# Patient Record
Sex: FEMALE | Race: BLACK OR AFRICAN AMERICAN | NOT HISPANIC OR LATINO | URBAN - METROPOLITAN AREA
[De-identification: names, ages, dates, MRNs, and addresses within clinical notes are randomized per-mention and may not be internally consistent; named-entity substitution may affect disease eponyms.]

---

## 2017-08-02 VITALS
TEMPERATURE: 97 F | RESPIRATION RATE: 16 BRPM | WEIGHT: 223.99 LBS | SYSTOLIC BLOOD PRESSURE: 132 MMHG | HEIGHT: 65 IN | HEART RATE: 78 BPM | DIASTOLIC BLOOD PRESSURE: 59 MMHG | OXYGEN SATURATION: 100 %

## 2017-08-02 RX ORDER — METFORMIN HYDROCHLORIDE 850 MG/1
1 TABLET ORAL
Qty: 0 | Refills: 0 | COMMUNITY

## 2017-08-02 RX ORDER — TRAVOPROST 0.04 MG/ML
1 SOLUTION/ DROPS OPHTHALMIC
Qty: 0 | Refills: 0 | COMMUNITY

## 2017-08-02 RX ORDER — SIMVASTATIN 20 MG/1
1 TABLET, FILM COATED ORAL
Qty: 0 | Refills: 0 | COMMUNITY

## 2017-08-02 RX ORDER — CHLORHEXIDINE GLUCONATE 213 G/1000ML
1 SOLUTION TOPICAL ONCE
Qty: 0 | Refills: 0 | Status: DISCONTINUED | OUTPATIENT
Start: 2017-08-04 | End: 2017-08-04

## 2017-08-02 RX ORDER — ASPIRIN/CALCIUM CARB/MAGNESIUM 324 MG
1 TABLET ORAL
Qty: 0 | Refills: 0 | COMMUNITY

## 2017-08-02 NOTE — H&P ADULT - HISTORY OF PRESENT ILLNESS
81 y.o Female with PMHx of HTN, dyslipidemia, NIDDM c/b neuropathy, Moderate Aortic Stenosis,   who presented to her cardiologist c/o dyspnea on am      Denies CP,  SOB, palpitations, dizziness, syncope, recent PND/orthopnea, LE edema, or decrease in exercise tolerance.    Echo done 03/2017 revealed no regional wall motion abnormailties     In light of pt's risk factors, above CCS Anginal Class _____ symptoms, and an abnormal Stress test, pt is now referred to [facility] for recommended Cardiac Cath with possible intervention if clinically indicated to  r/o suspected underlying CAD. 81 y.o Female with PMHx of HTN, dyslipidemia, NIDDM c/b neuropathy, Moderate Aortic Stenosis,   who presented to her cardiologist c/o dyspnea on am      Denies CP,  SOB, palpitations, dizziness, syncope, recent PND/orthopnea, LE edema, or decrease in exercise tolerance.    Echo done 03/2017 revealed no regional wall motion abnormalities     In light of pt's risk factors, above CCS Anginal Class _____ symptoms, and an abnormal Stress test, pt is now referred to [facility] for recommended Cardiac Cath with possible intervention if clinically indicated to  r/o suspected underlying CAD. 81 y.o Obese Female ambulates with a cane with PMHx of HTN, hyperlipidemia, EDE, NIDDM c/b neuropathy, ? EDE, (awaiting sleep study), moderate to severe aortic stenosis by Echo 03/2017, Non-obstructive CAD s/p diagnostic cardiac cath @ [facility] on 08/07/15 revealing normal LM, LAD/DIAG/LCx with mild luminal irregularities, 30% mid RCA, LVEF of 65%.  LVEDP of 7mmHg.  AS: Mean gradient opf 20.5mmHg.  KAYLENE of 1.46mm2.  NO MR noted.  Patient was recommended for medical therapy and started on ASA 81mg PO indefinitely for non obstructive CAD.  She was also recommended for AVR, however, pt remained undecisive at the time and did not want any pre-op work-up.  During follow-up visit with her cardiologist Dr. Davey  pt c/o  MARSHALL with increasing fatigue upon ambulation of less than one city block over the past few months.  She has also noticed experiencing dull, left sided chest pain described as a pressure , lasting minutes, unrelated to activity, and spontaneously resolve on their own.  Denies CP, recent PND/orthopnea, LE edema, palpitations, dizziness, or syncope .  Echo done 03/10/17 revealed no regional wall motion abnormalities,  mildly dilated left atrium, moderate to severe asymmetric hypertrophy pf the septum.  Moderate to severe aortic stenosis with KAYLENE of 0.7-0.97 (previously 1.0-1.4). Marked mitral annular calcification. Mild TR/LA, grade 2 diastolic dysfunction, LVEF of 68%.       In light of pt's risk factors, above CCS Anginal Class 4 symptoms, and worsening AS, pt is now referred to [facility] for recommended pre-op Right and Left Heart Cath prior to possible referral TAVR.      CATH HX:  Cardiac Cath @ [facility] on 08/07/15: Normal LM, LAD/DIAG/LCx with mild luminal irregularities, 30% mid RCA, LVEF of 65%.  LVEDP of 7mmHg.  AS: Mean gradient opf 20.5mmHg.  KAYLENE of 1.46mm2.  NO MR noted.  Recommended for optimal medical therapy and started on ASA 81mg PO indefinitely. 81 y.o Obese Female ambulates with a cane with PMHx of HTN, hyperlipidemia, NIDDM c/b neuropathy, ? EDE, (awaiting sleep study), moderate to severe aortic stenosis by Echo 03/2017, Non-obstructive CAD s/p diagnostic cardiac cath @ [facility] on 08/07/15 revealing normal LM, LAD/DIAG/LCx with mild luminal irregularities, 30% mid RCA, LVEF of 65%.  LVEDP of 7mmHg.  AS: Mean gradient opf 20.5mmHg.  KAYLENE of 1.46mm2.  NO MR noted.  Patient was recommended for medical therapy at the time and started on ASA 81mg PO indefinitely for non obstructive CAD.  She was also recommended for AVR, however, pt remained undecisive at the time and did not want any pre-op work-up.  During follow-up visit with her cardiologist Dr. Davey  pt c/o  MARSHALL with increasing fatigue upon ambulation of less than one city block over the past few months.  She has also noticed experiencing dull, left sided chest pain described as "pressure" , lasting minutes, unrelated to activity, and spontaneously resolves on its own.  Denies CP, recent PND/orthopnea, LE edema, palpitations, dizziness, or syncope .  Echo done 03/10/17 revealed no regional wall motion abnormalities,  mildly dilated left atrium, moderate to severe asymmetric hypertrophy pf the septum.  Moderate to severe aortic stenosis with KAYLENE of 0.7-0.97 (previously 1.0-1.4). Marked mitral annular calcification. Mild TR/MI, grade 2 diastolic dysfunction, LVEF of 68%.       In light of pt's risk factors, above CCS Anginal Class 4 symptoms, and worsening AS, pt is now referred to [facility] for recommended pre-op Right and Left Heart Cath prior to possible referral TAVR.      CATH HX:  Cardiac Cath @ [facility] on 08/07/15: Normal LM, LAD/DIAG/LCx with mild luminal irregularities, 30% mid RCA, LVEF of 65%.  LVEDP of 7mmHg.  AS: Mean gradient opf 20.5mmHg.  KAYLENE of 1.46mm2.  NO MR noted.  Recommended for optimal medical therapy and started on ASA 81mg PO indefinitely.

## 2017-08-02 NOTE — H&P ADULT - FAMILY HISTORY
Mother  Still living? Unknown  Family history of ischemic heart disease, Age at diagnosis: Age Unknown

## 2017-08-02 NOTE — H&P ADULT - PMH
Aortic stenosis    Diabetic neuropathy    Essential hypertension  Hypertension  Hyperlipidemia  Hyperlipidemia  Type 2 diabetes mellitus  Diabetes Aortic stenosis    Diabetic neuropathy    Essential hypertension  Hypertension  Glaucoma    Hyperlipidemia  Hyperlipidemia  Type 2 diabetes mellitus  Diabetes

## 2017-08-02 NOTE — H&P ADULT - ASSESSMENT
81 y.o Obese Female ambulates with a cane with PMHx of HTN, hyperlipidemia, EDE, NIDDM c/b neuropathy, ? EDE, (awaiting sleep study), moderate to severe aortic stenosis by Echo 03/2017, Non-obstructive CAD s/p diagnostic cardiac cath @ [facility] on 08/07/15 revealing normal LM, LAD/DIAG/LCx with mild luminal irregularities, 30% mid RCA, LVEF of 65%.  LVEDP of 7mmHg.  AS: Mean gradient opf 20.5mmHg.  KAYLENE of 1.46mm2.  NO MR noted.  Patient was recommended for medical therapy and started on ASA 81mg PO indefinitely for non obstructive CAD.  She was also recommended for AVR, however, pt remained undecisive at the time and did not want any pre-op work-up. She presents today  for diagnostic Right and Left Heart Cath prior to possible TAVR due to worsening symptomatic Aortic stenosis.      ASA: III and Mallampati: III

## 2017-08-04 ENCOUNTER — OUTPATIENT (OUTPATIENT)
Dept: OUTPATIENT SERVICES | Facility: HOSPITAL | Age: 82
LOS: 1 days | Discharge: NURSING, REHAB OR LONG-TERM CARE | End: 2017-08-04
Payer: MEDICARE

## 2017-08-04 LAB
ALBUMIN SERPL ELPH-MCNC: 4.4 G/DL — SIGNIFICANT CHANGE UP (ref 3.3–5)
ALP SERPL-CCNC: 77 U/L — SIGNIFICANT CHANGE UP (ref 40–120)
ALT FLD-CCNC: 12 U/L — SIGNIFICANT CHANGE UP (ref 10–45)
ANION GAP SERPL CALC-SCNC: 17 MMOL/L — SIGNIFICANT CHANGE UP (ref 5–17)
APTT BLD: 31.2 SEC — SIGNIFICANT CHANGE UP (ref 27.5–37.4)
AST SERPL-CCNC: 16 U/L — SIGNIFICANT CHANGE UP (ref 10–40)
BASOPHILS NFR BLD AUTO: 0.3 % — SIGNIFICANT CHANGE UP (ref 0–2)
BILIRUB SERPL-MCNC: 0.4 MG/DL — SIGNIFICANT CHANGE UP (ref 0.2–1.2)
BUN SERPL-MCNC: 33 MG/DL — HIGH (ref 7–23)
CALCIUM SERPL-MCNC: 10 MG/DL — SIGNIFICANT CHANGE UP (ref 8.4–10.5)
CHLORIDE SERPL-SCNC: 102 MMOL/L — SIGNIFICANT CHANGE UP (ref 96–108)
CHOLEST SERPL-MCNC: 135 MG/DL — SIGNIFICANT CHANGE UP (ref 10–199)
CK MB CFR SERPL CALC: 1.8 NG/ML — SIGNIFICANT CHANGE UP (ref 0–6.7)
CO2 SERPL-SCNC: 23 MMOL/L — SIGNIFICANT CHANGE UP (ref 22–31)
CREAT SERPL-MCNC: 1.4 MG/DL — HIGH (ref 0.5–1.3)
CRP SERPL-MCNC: 0.1 MG/DL — SIGNIFICANT CHANGE UP (ref 0–0.4)
EOSINOPHIL NFR BLD AUTO: 0.7 % — SIGNIFICANT CHANGE UP (ref 0–6)
GLUCOSE SERPL-MCNC: 160 MG/DL — HIGH (ref 70–99)
HBA1C BLD-MCNC: 6.7 % — HIGH (ref 4–5.6)
HCT VFR BLD CALC: 36.5 % — SIGNIFICANT CHANGE UP (ref 34.5–45)
HDLC SERPL-MCNC: 49 MG/DL — SIGNIFICANT CHANGE UP (ref 40–125)
HGB BLD-MCNC: 12 G/DL — SIGNIFICANT CHANGE UP (ref 11.5–15.5)
INR BLD: 0.86 — LOW (ref 0.88–1.16)
LIPID PNL WITH DIRECT LDL SERPL: 70 MG/DL — SIGNIFICANT CHANGE UP
LYMPHOCYTES # BLD AUTO: 29 % — SIGNIFICANT CHANGE UP (ref 13–44)
MCHC RBC-ENTMCNC: 29.7 PG — SIGNIFICANT CHANGE UP (ref 27–34)
MCHC RBC-ENTMCNC: 32.9 G/DL — SIGNIFICANT CHANGE UP (ref 32–36)
MCV RBC AUTO: 90.3 FL — SIGNIFICANT CHANGE UP (ref 80–100)
MONOCYTES NFR BLD AUTO: 7.4 % — SIGNIFICANT CHANGE UP (ref 2–14)
NEUTROPHILS NFR BLD AUTO: 62.6 % — SIGNIFICANT CHANGE UP (ref 43–77)
PLATELET # BLD AUTO: 256 K/UL — SIGNIFICANT CHANGE UP (ref 150–400)
POTASSIUM SERPL-MCNC: 3.8 MMOL/L — SIGNIFICANT CHANGE UP (ref 3.5–5.3)
POTASSIUM SERPL-SCNC: 3.8 MMOL/L — SIGNIFICANT CHANGE UP (ref 3.5–5.3)
PROT SERPL-MCNC: 8.5 G/DL — HIGH (ref 6–8.3)
PROTHROM AB SERPL-ACNC: 9.5 SEC — LOW (ref 9.8–12.7)
RBC # BLD: 4.04 M/UL — SIGNIFICANT CHANGE UP (ref 3.8–5.2)
RBC # FLD: 13.9 % — SIGNIFICANT CHANGE UP (ref 10.3–16.9)
SODIUM SERPL-SCNC: 142 MMOL/L — SIGNIFICANT CHANGE UP (ref 135–145)
TOTAL CHOLESTEROL/HDL RATIO MEASUREMENT: 2.8 RATIO — LOW (ref 3.3–7.1)
TRIGL SERPL-MCNC: 82 MG/DL — SIGNIFICANT CHANGE UP (ref 10–149)
WBC # BLD: 9.7 K/UL — SIGNIFICANT CHANGE UP (ref 3.8–10.5)
WBC # FLD AUTO: 9.7 K/UL — SIGNIFICANT CHANGE UP (ref 3.8–10.5)

## 2017-08-04 PROCEDURE — 80053 COMPREHEN METABOLIC PANEL: CPT

## 2017-08-04 PROCEDURE — C1769: CPT

## 2017-08-04 PROCEDURE — 85025 COMPLETE CBC W/AUTO DIFF WBC: CPT

## 2017-08-04 PROCEDURE — 93460 R&L HRT ART/VENTRICLE ANGIO: CPT | Mod: 26

## 2017-08-04 PROCEDURE — 93010 ELECTROCARDIOGRAM REPORT: CPT

## 2017-08-04 PROCEDURE — 93460 R&L HRT ART/VENTRICLE ANGIO: CPT

## 2017-08-04 PROCEDURE — 93306 TTE W/DOPPLER COMPLETE: CPT | Mod: 26

## 2017-08-04 PROCEDURE — 85610 PROTHROMBIN TIME: CPT

## 2017-08-04 PROCEDURE — C1887: CPT

## 2017-08-04 PROCEDURE — C1894: CPT

## 2017-08-04 PROCEDURE — C1889: CPT

## 2017-08-04 PROCEDURE — 93306 TTE W/DOPPLER COMPLETE: CPT

## 2017-08-04 PROCEDURE — 83036 HEMOGLOBIN GLYCOSYLATED A1C: CPT

## 2017-08-04 PROCEDURE — 85730 THROMBOPLASTIN TIME PARTIAL: CPT

## 2017-08-04 PROCEDURE — 82550 ASSAY OF CK (CPK): CPT

## 2017-08-04 PROCEDURE — 86140 C-REACTIVE PROTEIN: CPT

## 2017-08-04 PROCEDURE — 93005 ELECTROCARDIOGRAM TRACING: CPT

## 2017-08-04 PROCEDURE — 82553 CREATINE MB FRACTION: CPT

## 2017-08-04 PROCEDURE — 80061 LIPID PANEL: CPT

## 2017-08-04 RX ORDER — SODIUM CHLORIDE 9 MG/ML
500 INJECTION, SOLUTION INTRAVENOUS
Qty: 0 | Refills: 0 | Status: DISCONTINUED | OUTPATIENT
Start: 2017-08-04 | End: 2017-08-04

## 2017-08-04 RX ORDER — INSULIN LISPRO 100/ML
VIAL (ML) SUBCUTANEOUS ONCE
Qty: 0 | Refills: 0 | Status: DISCONTINUED | OUTPATIENT
Start: 2017-08-04 | End: 2017-08-04

## 2017-08-04 RX ORDER — SODIUM CHLORIDE 9 MG/ML
500 INJECTION INTRAMUSCULAR; INTRAVENOUS; SUBCUTANEOUS
Qty: 0 | Refills: 0 | Status: DISCONTINUED | OUTPATIENT
Start: 2017-08-04 | End: 2017-08-04

## 2017-08-04 RX ADMIN — SODIUM CHLORIDE 50 MILLILITER(S): 9 INJECTION, SOLUTION INTRAVENOUS at 12:52

## 2017-08-04 NOTE — PROGRESS NOTE ADULT - SUBJECTIVE AND OBJECTIVE BOX
Interventional Cardiology PA SDA Discharge Note    Patient without complaints.     Afebrile, VSS    Ext:    		Right Radial : no   hematoma,   no  bleeding, dressing; C/D/I      Pulses:    intact RAD to baseline     A/P:  81 y.o Obese Female ambulates with a cane with PMHx of HTN, hyperlipidemia, EDE, NIDDM c/b neuropathy, ? EDE, (awaiting sleep study), moderate to severe aortic stenosis by Echo 03/2017, Non-obstructive CAD s/p diagnostic cardiac cath @ [facility] on 08/07/15 revealing normal LM, LAD/DIAG/LCx with mild luminal irregularities, 30% mid RCA, LVEF of 65%.  LVEDP of 7mmHg.  AS: Mean gradient opf 20.5mmHg.  KAYLENE of 1.46mm2.  NO MR noted.  Patient was recommended for medical therapy and started on ASA 81mg PO indefinitely for non obstructive CAD.  She was also recommended for AVR, however, pt remained undecisive at the time and did not want any pre-op work-up. She presents today  for diagnostic Right and Left Heart Cath prior to possible TAVR due to worsening symptomatic Aortic stenosis.      LM-normal, LAD large mild LI, midLAD 30%, mRCA 30% large dominant, RPDA small, LV hyperdynamic, LVEF 75%, LVEDP 3mmHg, no MR, moderate-severe ASAVA 1.3    Right heart cath: RA 10mmHg, RV 38/6 (10), PA 36/7 (18), PCWP 7mmHg    Patient seen by Dr Mcclellan's team to evaluate AS as an outpatient.           1.	Stable for discharge as per attending . _____Petar____ after bed rest, pt voids, groin/wrist stable and 30 minutes of ambulation.  2.	Follow-up with PMD/Cardiologist _____Petar______ in 1-2 weeks  3.	Discharged forms signed and copies in chart Interventional Cardiology PA SDA Discharge Note    Patient without complaints.     Afebrile, VSS    Ext:    	Right Radial : no   hematoma,   no  bleeding, dressing; C/D/I      Pulses:    intact RAD to baseline     A/P:  81 y.o Obese Female ambulates with a cane with PMHx of HTN, hyperlipidemia, EDE, NIDDM c/b neuropathy, ? EDE, (awaiting sleep study), moderate to severe aortic stenosis by Echo 03/2017, Non-obstructive CAD s/p diagnostic cardiac cath @ [facility] on 08/07/15 revealing normal LM, LAD/DIAG/LCx with mild luminal irregularities, 30% mid RCA, LVEF of 65%.  LVEDP of 7mmHg.  AS: Mean gradient opf 20.5mmHg.  KAYLENE of 1.46mm2.  NO MR noted.  Patient was recommended for medical therapy and started on ASA 81mg PO indefinitely for non obstructive CAD.  She was also recommended for AVR, however, pt remained undecisive at the time and did not want any pre-op work-up. She presents today  for diagnostic Right and Left Heart Cath prior to possible TAVR due to worsening symptomatic Aortic stenosis.      LM-normal, LAD large mild LI, midLAD 30%, mRCA 30% large dominant, RPDA small, LV hyperdynamic, LVEF 75%, LVEDP 3mmHg, no MR, moderate-severe ASAVA 1.3    Right heart cath: RA 10mmHg, RV 38/6 (10), PA 36/7 (18), PCWP 7mmHg    Patient seen by Dr Mcclellan's team to evaluate AS as an outpatient.     Echocardiogram was done prior to discharge. Carotid dopplers, PFTs, EKG were not completed.           1.	Stable for discharge as per attending Dr. _____Petar____ after bed rest, pt voids, groin/wrist stable and 30 minutes of ambulation.  2.	Follow-up with PMD/Cardiologist _____Petar______ in 1-2 weeks  3.	Discharged forms signed and copies in chart Interventional Cardiology PA SDA Discharge Note    Patient without complaints.     Afebrile, VSS    Ext:    	Right Radial : no   hematoma,   no  bleeding, dressing; C/D/I      Pulses:    intact RAD to baseline     A/P:  81 y.o Obese Female ambulates with a cane with PMHx of HTN, hyperlipidemia, EDE, NIDDM c/b neuropathy, ? EDE, (awaiting sleep study), moderate to severe aortic stenosis by Echo 03/2017, Non-obstructive CAD s/p diagnostic cardiac cath @ [facility] on 08/07/15 revealing normal LM, LAD/DIAG/LCx with mild luminal irregularities, 30% mid RCA, LVEF of 65%.  LVEDP of 7mmHg.  AS: Mean gradient opf 20.5mmHg.  KAYLENE of 1.46mm2.  NO MR noted.  Patient was recommended for medical therapy and started on ASA 81mg PO indefinitely for non obstructive CAD.  She was also recommended for AVR, however, pt remained undecisive at the time and did not want any pre-op work-up. She presents today  for diagnostic Right and Left Heart Cath prior to possible TAVR due to worsening symptomatic Aortic stenosis.      LM-normal, LAD large mild LI, midLAD 30%, mRCA 30% large dominant, RPDA small, LV hyperdynamic, LVEF 75%, LVEDP 3mmHg, no MR, moderate-severe ASAVA 1.3    Right heart cath: RA 10mmHg, RV 38/6 (10), PA 36/7 (18), PCWP 7mmHg    Patient seen by Dr Mcclellan's team to evaluate AS as an outpatient.     Echocardiogram was done prior to discharge. Carotid dopplers, PFTs, EKG were not completed. Patient stated she wanted to call them to make an appointment if she decided to have surgery with them          1.	Stable for discharge as per attending Dr. _____Ronniein____ after bed rest, pt voids, groin/wrist stable and 30 minutes of ambulation.  2.	Follow-up with PMD/Cardiologist _____Ronniein______ in 1-2 weeks  3.	Discharged forms signed and copies in chart

## 2017-08-11 DIAGNOSIS — I25.118 ATHEROSCLEROTIC HEART DISEASE OF NATIVE CORONARY ARTERY WITH OTHER FORMS OF ANGINA PECTORIS: ICD-10-CM

## 2017-08-11 DIAGNOSIS — I35.0 NONRHEUMATIC AORTIC (VALVE) STENOSIS: ICD-10-CM

## 2017-09-07 PROBLEM — H40.9 UNSPECIFIED GLAUCOMA: Chronic | Status: ACTIVE | Noted: 2017-08-02

## 2017-09-07 PROBLEM — Z00.00 ENCOUNTER FOR PREVENTIVE HEALTH EXAMINATION: Status: ACTIVE | Noted: 2017-09-07

## 2017-09-07 PROBLEM — I35.0 NONRHEUMATIC AORTIC (VALVE) STENOSIS: Chronic | Status: ACTIVE | Noted: 2017-08-02

## 2017-09-07 PROBLEM — E11.40 TYPE 2 DIABETES MELLITUS WITH DIABETIC NEUROPATHY, UNSPECIFIED: Chronic | Status: ACTIVE | Noted: 2017-08-02

## 2017-09-11 ENCOUNTER — APPOINTMENT (OUTPATIENT)
Dept: CARDIOTHORACIC SURGERY | Facility: CLINIC | Age: 82
End: 2017-09-11
Payer: MEDICARE

## 2017-09-18 ENCOUNTER — OUTPATIENT (OUTPATIENT)
Dept: OUTPATIENT SERVICES | Facility: HOSPITAL | Age: 82
LOS: 1 days | End: 2017-09-18
Payer: MEDICARE

## 2017-09-18 ENCOUNTER — APPOINTMENT (OUTPATIENT)
Dept: CARDIOTHORACIC SURGERY | Facility: CLINIC | Age: 82
End: 2017-09-18
Payer: MEDICARE

## 2017-09-18 VITALS
SYSTOLIC BLOOD PRESSURE: 112 MMHG | DIASTOLIC BLOOD PRESSURE: 54 MMHG | HEIGHT: 63 IN | WEIGHT: 229 LBS | OXYGEN SATURATION: 96 % | HEART RATE: 86 BPM | BODY MASS INDEX: 40.57 KG/M2 | RESPIRATION RATE: 18 BRPM

## 2017-09-18 VITALS
WEIGHT: 229 LBS | DIASTOLIC BLOOD PRESSURE: 54 MMHG | HEART RATE: 86 BPM | HEIGHT: 63 IN | SYSTOLIC BLOOD PRESSURE: 112 MMHG | BODY MASS INDEX: 40.57 KG/M2 | OXYGEN SATURATION: 96 % | RESPIRATION RATE: 18 BRPM

## 2017-09-18 DIAGNOSIS — Z86.79 PERSONAL HISTORY OF OTHER DISEASES OF THE CIRCULATORY SYSTEM: ICD-10-CM

## 2017-09-18 DIAGNOSIS — Z86.39 PERSONAL HISTORY OF OTHER ENDOCRINE, NUTRITIONAL AND METABOLIC DISEASE: ICD-10-CM

## 2017-09-18 DIAGNOSIS — R06.09 OTHER FORMS OF DYSPNEA: ICD-10-CM

## 2017-09-18 DIAGNOSIS — Z86.69 PERSONAL HISTORY OF OTHER DISEASES OF THE NERVOUS SYSTEM AND SENSE ORGANS: ICD-10-CM

## 2017-09-18 DIAGNOSIS — Z82.3 FAMILY HISTORY OF STROKE: ICD-10-CM

## 2017-09-18 LAB
ALBUMIN SERPL ELPH-MCNC: 3.8 G/DL — SIGNIFICANT CHANGE UP (ref 3.3–5)
ALP SERPL-CCNC: 72 U/L — SIGNIFICANT CHANGE UP (ref 40–120)
ALT FLD-CCNC: 10 U/L — SIGNIFICANT CHANGE UP (ref 10–45)
ANION GAP SERPL CALC-SCNC: 16 MMOL/L — SIGNIFICANT CHANGE UP (ref 5–17)
APTT BLD: 38 SEC — HIGH (ref 27.5–37.4)
AST SERPL-CCNC: 15 U/L — SIGNIFICANT CHANGE UP (ref 10–40)
BASOPHILS NFR BLD AUTO: 0.4 % — SIGNIFICANT CHANGE UP (ref 0–2)
BILIRUB SERPL-MCNC: 0.6 MG/DL — SIGNIFICANT CHANGE UP (ref 0.2–1.2)
BUN SERPL-MCNC: 29 MG/DL — HIGH (ref 7–23)
CALCIUM SERPL-MCNC: 9.9 MG/DL — SIGNIFICANT CHANGE UP (ref 8.4–10.5)
CHLORIDE SERPL-SCNC: 102 MMOL/L — SIGNIFICANT CHANGE UP (ref 96–108)
CO2 SERPL-SCNC: 21 MMOL/L — LOW (ref 22–31)
CREAT SERPL-MCNC: 1.33 MG/DL — HIGH (ref 0.5–1.3)
EOSINOPHIL NFR BLD AUTO: 0.9 % — SIGNIFICANT CHANGE UP (ref 0–6)
GLUCOSE SERPL-MCNC: 200 MG/DL — HIGH (ref 70–99)
HCT VFR BLD CALC: 34.3 % — LOW (ref 34.5–45)
HGB BLD-MCNC: 11.6 G/DL — SIGNIFICANT CHANGE UP (ref 11.5–15.5)
INR BLD: 0.93 — SIGNIFICANT CHANGE UP (ref 0.88–1.16)
LYMPHOCYTES # BLD AUTO: 34.8 % — SIGNIFICANT CHANGE UP (ref 13–44)
MCHC RBC-ENTMCNC: 30.6 PG — SIGNIFICANT CHANGE UP (ref 27–34)
MCHC RBC-ENTMCNC: 33.8 G/DL — SIGNIFICANT CHANGE UP (ref 32–36)
MCV RBC AUTO: 90.5 FL — SIGNIFICANT CHANGE UP (ref 80–100)
MONOCYTES NFR BLD AUTO: 4.8 % — SIGNIFICANT CHANGE UP (ref 2–14)
NEUTROPHILS NFR BLD AUTO: 59.1 % — SIGNIFICANT CHANGE UP (ref 43–77)
PLATELET # BLD AUTO: 282 K/UL — SIGNIFICANT CHANGE UP (ref 150–400)
POTASSIUM SERPL-MCNC: 3.7 MMOL/L — SIGNIFICANT CHANGE UP (ref 3.5–5.3)
POTASSIUM SERPL-SCNC: 3.7 MMOL/L — SIGNIFICANT CHANGE UP (ref 3.5–5.3)
PROT SERPL-MCNC: 7.9 G/DL — SIGNIFICANT CHANGE UP (ref 6–8.3)
PROTHROM AB SERPL-ACNC: 10.3 SEC — SIGNIFICANT CHANGE UP (ref 9.8–12.7)
RBC # BLD: 3.79 M/UL — LOW (ref 3.8–5.2)
RBC # FLD: 14.5 % — SIGNIFICANT CHANGE UP (ref 10.3–16.9)
SODIUM SERPL-SCNC: 139 MMOL/L — SIGNIFICANT CHANGE UP (ref 135–145)
WBC # BLD: 7.8 K/UL — SIGNIFICANT CHANGE UP (ref 3.8–10.5)
WBC # FLD AUTO: 7.8 K/UL — SIGNIFICANT CHANGE UP (ref 3.8–10.5)

## 2017-09-18 PROCEDURE — 99205 OFFICE O/P NEW HI 60 MIN: CPT

## 2017-09-18 PROCEDURE — 99215 OFFICE O/P EST HI 40 MIN: CPT

## 2017-09-19 DIAGNOSIS — I35.0 NONRHEUMATIC AORTIC (VALVE) STENOSIS: ICD-10-CM

## 2017-09-19 PROBLEM — Z86.39 HISTORY OF HYPERLIPIDEMIA: Status: RESOLVED | Noted: 2017-09-19 | Resolved: 2017-09-19

## 2017-09-19 PROBLEM — Z82.3 FAMILY HISTORY OF CEREBROVASCULAR ACCIDENT (CVA): Status: ACTIVE | Noted: 2017-09-19

## 2017-09-19 PROBLEM — R06.09 DYSPNEA ON EXERTION: Status: ACTIVE | Noted: 2017-09-19

## 2017-09-19 PROBLEM — Z86.79 HISTORY OF HYPERTENSION: Status: RESOLVED | Noted: 2017-09-19 | Resolved: 2017-09-19

## 2017-09-19 PROBLEM — Z86.69 HISTORY OF SLEEP APNEA: Status: RESOLVED | Noted: 2017-09-19 | Resolved: 2017-09-19

## 2017-09-19 PROBLEM — Z86.79 HISTORY OF CORONARY ARTERY DISEASE: Status: RESOLVED | Noted: 2017-09-19 | Resolved: 2017-09-19

## 2017-09-19 PROBLEM — Z86.39 HISTORY OF DIABETES MELLITUS: Status: RESOLVED | Noted: 2017-09-19 | Resolved: 2017-09-19

## 2017-09-19 RX ORDER — SIMVASTATIN 10 MG/1
10 TABLET, FILM COATED ORAL
Refills: 0 | Status: ACTIVE | COMMUNITY

## 2017-09-19 RX ORDER — METFORMIN HYDROCHLORIDE 500 MG/1
500 TABLET, COATED ORAL
Qty: 180 | Refills: 0 | Status: ACTIVE | COMMUNITY

## 2017-09-19 RX ORDER — METOPROLOL SUCCINATE 100 MG/1
100 TABLET, EXTENDED RELEASE ORAL DAILY
Refills: 0 | Status: ACTIVE | COMMUNITY

## 2017-09-19 RX ORDER — LOSARTAN POTASSIUM AND HYDROCHLOROTHIAZIDE 100; 25 MG/1; MG/1
100-25 TABLET, FILM COATED ORAL DAILY
Refills: 0 | Status: ACTIVE | COMMUNITY

## 2017-09-27 RX ORDER — ASPIRIN 81 MG
81 TABLET, DELAYED RELEASE (ENTERIC COATED) ORAL DAILY
Qty: 30 | Refills: 0 | Status: ACTIVE | COMMUNITY

## 2017-10-09 ENCOUNTER — FORM ENCOUNTER (OUTPATIENT)
Age: 82
End: 2017-10-09

## 2017-10-10 ENCOUNTER — OUTPATIENT (OUTPATIENT)
Dept: OUTPATIENT SERVICES | Facility: HOSPITAL | Age: 82
LOS: 1 days | End: 2017-10-10
Payer: MEDICARE

## 2017-10-10 PROCEDURE — 75572 CT HRT W/3D IMAGE: CPT

## 2017-10-10 PROCEDURE — 74174 CTA ABD&PLVS W/CONTRAST: CPT

## 2017-10-10 PROCEDURE — 93880 EXTRACRANIAL BILAT STUDY: CPT

## 2017-10-10 PROCEDURE — 74174 CTA ABD&PLVS W/CONTRAST: CPT | Mod: 26

## 2017-10-10 PROCEDURE — 75572 CT HRT W/3D IMAGE: CPT | Mod: 26

## 2017-10-10 PROCEDURE — 93880 EXTRACRANIAL BILAT STUDY: CPT | Mod: 26

## 2017-10-13 DIAGNOSIS — I35.8 OTHER NONRHEUMATIC AORTIC VALVE DISORDERS: ICD-10-CM

## 2017-10-13 DIAGNOSIS — Z01.818 ENCOUNTER FOR OTHER PREPROCEDURAL EXAMINATION: ICD-10-CM

## 2017-10-13 DIAGNOSIS — I25.10 ATHEROSCLEROTIC HEART DISEASE OF NATIVE CORONARY ARTERY WITHOUT ANGINA PECTORIS: ICD-10-CM

## 2017-10-13 DIAGNOSIS — I65.23 OCCLUSION AND STENOSIS OF BILATERAL CAROTID ARTERIES: ICD-10-CM

## 2017-10-13 DIAGNOSIS — I05.8 OTHER RHEUMATIC MITRAL VALVE DISEASES: ICD-10-CM

## 2017-10-17 ENCOUNTER — INPATIENT (INPATIENT)
Facility: HOSPITAL | Age: 82
LOS: 1 days | Discharge: HOME HEALTH | DRG: 267 | End: 2017-10-19
Attending: INTERNAL MEDICINE | Admitting: INTERNAL MEDICINE
Payer: MEDICARE

## 2017-10-17 ENCOUNTER — APPOINTMENT (OUTPATIENT)
Dept: CARDIOTHORACIC SURGERY | Facility: HOSPITAL | Age: 82
End: 2017-10-17
Payer: MEDICARE

## 2017-10-17 VITALS — RESPIRATION RATE: 16 BRPM | TEMPERATURE: 96 F | OXYGEN SATURATION: 100 % | HEART RATE: 78 BPM

## 2017-10-17 DIAGNOSIS — Z00.6 ENCOUNTER FOR EXAMINATION FOR NORMAL COMPARISON AND CONTROL IN CLINICAL RESEARCH PROGRAM: ICD-10-CM

## 2017-10-17 DIAGNOSIS — I35.0 NONRHEUMATIC AORTIC (VALVE) STENOSIS: ICD-10-CM

## 2017-10-17 LAB
ALBUMIN SERPL ELPH-MCNC: 3.9 G/DL — SIGNIFICANT CHANGE UP (ref 3.3–5)
ALP SERPL-CCNC: 66 U/L — SIGNIFICANT CHANGE UP (ref 40–120)
ALT FLD-CCNC: 13 U/L — SIGNIFICANT CHANGE UP (ref 10–45)
ANION GAP SERPL CALC-SCNC: 12 MMOL/L — SIGNIFICANT CHANGE UP (ref 5–17)
ANION GAP SERPL CALC-SCNC: 14 MMOL/L — SIGNIFICANT CHANGE UP (ref 5–17)
APTT BLD: 27.5 SEC — SIGNIFICANT CHANGE UP (ref 27.5–37.4)
APTT BLD: 84.5 SEC — HIGH (ref 27.5–37.4)
AST SERPL-CCNC: 30 U/L — SIGNIFICANT CHANGE UP (ref 10–40)
BASE EXCESS BLDA CALC-SCNC: 1.5 MMOL/L — SIGNIFICANT CHANGE UP (ref -2–3)
BASOPHILS NFR BLD AUTO: 0.3 % — SIGNIFICANT CHANGE UP (ref 0–2)
BILIRUB SERPL-MCNC: 0.8 MG/DL — SIGNIFICANT CHANGE UP (ref 0.2–1.2)
BUN SERPL-MCNC: 26 MG/DL — HIGH (ref 7–23)
BUN SERPL-MCNC: 27 MG/DL — HIGH (ref 7–23)
CALCIUM SERPL-MCNC: 10 MG/DL — SIGNIFICANT CHANGE UP (ref 8.4–10.5)
CALCIUM SERPL-MCNC: 10.3 MG/DL — SIGNIFICANT CHANGE UP (ref 8.4–10.5)
CHLORIDE SERPL-SCNC: 103 MMOL/L — SIGNIFICANT CHANGE UP (ref 96–108)
CHLORIDE SERPL-SCNC: 99 MMOL/L — SIGNIFICANT CHANGE UP (ref 96–108)
CO2 SERPL-SCNC: 23 MMOL/L — SIGNIFICANT CHANGE UP (ref 22–31)
CO2 SERPL-SCNC: 24 MMOL/L — SIGNIFICANT CHANGE UP (ref 22–31)
CREAT SERPL-MCNC: 1.26 MG/DL — SIGNIFICANT CHANGE UP (ref 0.5–1.3)
CREAT SERPL-MCNC: 1.38 MG/DL — HIGH (ref 0.5–1.3)
EOSINOPHIL NFR BLD AUTO: 1.1 % — SIGNIFICANT CHANGE UP (ref 0–6)
GAS PNL BLDA: SIGNIFICANT CHANGE UP
GAS PNL BLDA: SIGNIFICANT CHANGE UP
GLUCOSE BLDC GLUCOMTR-MCNC: 196 MG/DL — HIGH (ref 70–99)
GLUCOSE SERPL-MCNC: 144 MG/DL — HIGH (ref 70–99)
GLUCOSE SERPL-MCNC: 161 MG/DL — HIGH (ref 70–99)
HCO3 BLDA-SCNC: 26 MMOL/L — SIGNIFICANT CHANGE UP (ref 21–28)
HCT VFR BLD CALC: 31.1 % — LOW (ref 34.5–45)
HCT VFR BLD CALC: 32.5 % — LOW (ref 34.5–45)
HGB BLD-MCNC: 10.3 G/DL — LOW (ref 11.5–15.5)
HGB BLD-MCNC: 11 G/DL — LOW (ref 11.5–15.5)
INR BLD: 0.99 — SIGNIFICANT CHANGE UP (ref 0.88–1.16)
INR BLD: 1.11 — SIGNIFICANT CHANGE UP (ref 0.88–1.16)
LYMPHOCYTES # BLD AUTO: 34.4 % — SIGNIFICANT CHANGE UP (ref 13–44)
MAGNESIUM SERPL-MCNC: 1.7 MG/DL — SIGNIFICANT CHANGE UP (ref 1.6–2.6)
MAGNESIUM SERPL-MCNC: 1.9 MG/DL — SIGNIFICANT CHANGE UP (ref 1.6–2.6)
MCHC RBC-ENTMCNC: 29.7 PG — SIGNIFICANT CHANGE UP (ref 27–34)
MCHC RBC-ENTMCNC: 30.1 PG — SIGNIFICANT CHANGE UP (ref 27–34)
MCHC RBC-ENTMCNC: 33.1 G/DL — SIGNIFICANT CHANGE UP (ref 32–36)
MCHC RBC-ENTMCNC: 33.8 G/DL — SIGNIFICANT CHANGE UP (ref 32–36)
MCV RBC AUTO: 88.8 FL — SIGNIFICANT CHANGE UP (ref 80–100)
MCV RBC AUTO: 89.6 FL — SIGNIFICANT CHANGE UP (ref 80–100)
MONOCYTES NFR BLD AUTO: 9 % — SIGNIFICANT CHANGE UP (ref 2–14)
NEUTROPHILS NFR BLD AUTO: 55.2 % — SIGNIFICANT CHANGE UP (ref 43–77)
NT-PROBNP SERPL-SCNC: 294 PG/ML — SIGNIFICANT CHANGE UP (ref 0–300)
PCO2 BLDA: 40 MMHG — SIGNIFICANT CHANGE UP (ref 32–45)
PH BLDA: 7.43 — SIGNIFICANT CHANGE UP (ref 7.35–7.45)
PLATELET # BLD AUTO: 256 K/UL — SIGNIFICANT CHANGE UP (ref 150–400)
PLATELET # BLD AUTO: 298 K/UL — SIGNIFICANT CHANGE UP (ref 150–400)
PO2 BLDA: 83 MMHG — SIGNIFICANT CHANGE UP (ref 83–108)
POTASSIUM SERPL-MCNC: 3.9 MMOL/L — SIGNIFICANT CHANGE UP (ref 3.5–5.3)
POTASSIUM SERPL-MCNC: 5.7 MMOL/L — HIGH (ref 3.5–5.3)
POTASSIUM SERPL-SCNC: 3.9 MMOL/L — SIGNIFICANT CHANGE UP (ref 3.5–5.3)
POTASSIUM SERPL-SCNC: 5.7 MMOL/L — HIGH (ref 3.5–5.3)
PROT SERPL-MCNC: 8.1 G/DL — SIGNIFICANT CHANGE UP (ref 6–8.3)
PROTHROM AB SERPL-ACNC: 11 SEC — SIGNIFICANT CHANGE UP (ref 9.8–12.7)
PROTHROM AB SERPL-ACNC: 12.3 SEC — SIGNIFICANT CHANGE UP (ref 9.8–12.7)
RBC # BLD: 3.47 M/UL — LOW (ref 3.8–5.2)
RBC # BLD: 3.66 M/UL — LOW (ref 3.8–5.2)
RBC # FLD: 14.1 % — SIGNIFICANT CHANGE UP (ref 10.3–16.9)
RBC # FLD: 14.3 % — SIGNIFICANT CHANGE UP (ref 10.3–16.9)
SAO2 % BLDA: 96 % — SIGNIFICANT CHANGE UP (ref 95–100)
SODIUM SERPL-SCNC: 136 MMOL/L — SIGNIFICANT CHANGE UP (ref 135–145)
SODIUM SERPL-SCNC: 139 MMOL/L — SIGNIFICANT CHANGE UP (ref 135–145)
WBC # BLD: 10.1 K/UL — SIGNIFICANT CHANGE UP (ref 3.8–10.5)
WBC # BLD: 11.3 K/UL — HIGH (ref 3.8–10.5)
WBC # FLD AUTO: 10.1 K/UL — SIGNIFICANT CHANGE UP (ref 3.8–10.5)
WBC # FLD AUTO: 11.3 K/UL — HIGH (ref 3.8–10.5)

## 2017-10-17 PROCEDURE — 36415 COLL VENOUS BLD VENIPUNCTURE: CPT

## 2017-10-17 PROCEDURE — 85025 COMPLETE CBC W/AUTO DIFF WBC: CPT

## 2017-10-17 PROCEDURE — 33361 REPLACE AORTIC VALVE PERQ: CPT | Mod: 62,Q0

## 2017-10-17 PROCEDURE — 99233 SBSQ HOSP IP/OBS HIGH 50: CPT | Mod: 25

## 2017-10-17 PROCEDURE — 71010: CPT | Mod: 26

## 2017-10-17 PROCEDURE — 80053 COMPREHEN METABOLIC PANEL: CPT

## 2017-10-17 PROCEDURE — 93010 ELECTROCARDIOGRAM REPORT: CPT | Mod: 59

## 2017-10-17 PROCEDURE — 93306 TTE W/DOPPLER COMPLETE: CPT | Mod: 26,59

## 2017-10-17 PROCEDURE — 85730 THROMBOPLASTIN TIME PARTIAL: CPT

## 2017-10-17 PROCEDURE — 85610 PROTHROMBIN TIME: CPT

## 2017-10-17 RX ORDER — HEPARIN SODIUM 5000 [USP'U]/ML
7500 INJECTION INTRAVENOUS; SUBCUTANEOUS EVERY 8 HOURS
Qty: 0 | Refills: 0 | Status: DISCONTINUED | OUTPATIENT
Start: 2017-10-17 | End: 2017-10-19

## 2017-10-17 RX ORDER — DEXTROSE 50 % IN WATER 50 %
1 SYRINGE (ML) INTRAVENOUS ONCE
Qty: 0 | Refills: 0 | Status: DISCONTINUED | OUTPATIENT
Start: 2017-10-17 | End: 2017-10-19

## 2017-10-17 RX ORDER — DEXTROSE 50 % IN WATER 50 %
12.5 SYRINGE (ML) INTRAVENOUS ONCE
Qty: 0 | Refills: 0 | Status: DISCONTINUED | OUTPATIENT
Start: 2017-10-17 | End: 2017-10-19

## 2017-10-17 RX ORDER — GLUCAGON INJECTION, SOLUTION 0.5 MG/.1ML
1 INJECTION, SOLUTION SUBCUTANEOUS ONCE
Qty: 0 | Refills: 0 | Status: DISCONTINUED | OUTPATIENT
Start: 2017-10-17 | End: 2017-10-19

## 2017-10-17 RX ORDER — POTASSIUM CHLORIDE 20 MEQ
10 PACKET (EA) ORAL
Qty: 0 | Refills: 0 | Status: DISCONTINUED | OUTPATIENT
Start: 2017-10-17 | End: 2017-10-17

## 2017-10-17 RX ORDER — DEXTROSE 50 % IN WATER 50 %
25 SYRINGE (ML) INTRAVENOUS ONCE
Qty: 0 | Refills: 0 | Status: DISCONTINUED | OUTPATIENT
Start: 2017-10-17 | End: 2017-10-19

## 2017-10-17 RX ORDER — POTASSIUM CHLORIDE 20 MEQ
10 PACKET (EA) ORAL ONCE
Qty: 0 | Refills: 0 | Status: DISCONTINUED | OUTPATIENT
Start: 2017-10-17 | End: 2017-10-17

## 2017-10-17 RX ORDER — SODIUM CHLORIDE 9 MG/ML
1000 INJECTION, SOLUTION INTRAVENOUS
Qty: 0 | Refills: 0 | Status: DISCONTINUED | OUTPATIENT
Start: 2017-10-17 | End: 2017-10-18

## 2017-10-17 RX ORDER — CLOPIDOGREL BISULFATE 75 MG/1
300 TABLET, FILM COATED ORAL ONCE
Qty: 0 | Refills: 0 | Status: COMPLETED | OUTPATIENT
Start: 2017-10-17 | End: 2017-10-17

## 2017-10-17 RX ORDER — ACETAMINOPHEN 500 MG
650 TABLET ORAL EVERY 6 HOURS
Qty: 0 | Refills: 0 | Status: DISCONTINUED | OUTPATIENT
Start: 2017-10-17 | End: 2017-10-19

## 2017-10-17 RX ORDER — ACETAMINOPHEN 500 MG
1000 TABLET ORAL ONCE
Qty: 0 | Refills: 0 | Status: COMPLETED | OUTPATIENT
Start: 2017-10-17 | End: 2017-10-18

## 2017-10-17 RX ORDER — INSULIN LISPRO 100/ML
VIAL (ML) SUBCUTANEOUS
Qty: 0 | Refills: 0 | Status: DISCONTINUED | OUTPATIENT
Start: 2017-10-17 | End: 2017-10-19

## 2017-10-17 RX ORDER — MEPERIDINE HYDROCHLORIDE 50 MG/ML
25 INJECTION INTRAMUSCULAR; INTRAVENOUS; SUBCUTANEOUS ONCE
Qty: 0 | Refills: 0 | Status: DISCONTINUED | OUTPATIENT
Start: 2017-10-17 | End: 2017-10-17

## 2017-10-17 RX ORDER — SODIUM CHLORIDE 9 MG/ML
1000 INJECTION, SOLUTION INTRAVENOUS
Qty: 0 | Refills: 0 | Status: DISCONTINUED | OUTPATIENT
Start: 2017-10-17 | End: 2017-10-19

## 2017-10-17 RX ORDER — FAMOTIDINE 10 MG/ML
20 INJECTION INTRAVENOUS EVERY 12 HOURS
Qty: 0 | Refills: 0 | Status: DISCONTINUED | OUTPATIENT
Start: 2017-10-17 | End: 2017-10-17

## 2017-10-17 RX ORDER — CEFAZOLIN SODIUM 1 G
2000 VIAL (EA) INJECTION EVERY 8 HOURS
Qty: 0 | Refills: 0 | Status: COMPLETED | OUTPATIENT
Start: 2017-10-17 | End: 2017-10-19

## 2017-10-17 RX ORDER — ASPIRIN/CALCIUM CARB/MAGNESIUM 324 MG
81 TABLET ORAL DAILY
Qty: 0 | Refills: 0 | Status: DISCONTINUED | OUTPATIENT
Start: 2017-10-18 | End: 2017-10-19

## 2017-10-17 RX ORDER — SIMVASTATIN 20 MG/1
10 TABLET, FILM COATED ORAL AT BEDTIME
Qty: 0 | Refills: 0 | Status: DISCONTINUED | OUTPATIENT
Start: 2017-10-17 | End: 2017-10-19

## 2017-10-17 RX ORDER — CLOPIDOGREL BISULFATE 75 MG/1
75 TABLET, FILM COATED ORAL DAILY
Qty: 0 | Refills: 0 | Status: DISCONTINUED | OUTPATIENT
Start: 2017-10-18 | End: 2017-10-19

## 2017-10-17 RX ORDER — FAMOTIDINE 10 MG/ML
20 INJECTION INTRAVENOUS DAILY
Qty: 0 | Refills: 0 | Status: DISCONTINUED | OUTPATIENT
Start: 2017-10-17 | End: 2017-10-19

## 2017-10-17 RX ADMIN — CLOPIDOGREL BISULFATE 300 MILLIGRAM(S): 75 TABLET, FILM COATED ORAL at 19:55

## 2017-10-17 RX ADMIN — HEPARIN SODIUM 7500 UNIT(S): 5000 INJECTION INTRAVENOUS; SUBCUTANEOUS at 21:17

## 2017-10-17 RX ADMIN — FAMOTIDINE 20 MILLIGRAM(S): 10 INJECTION INTRAVENOUS at 21:17

## 2017-10-17 RX ADMIN — Medication 2: at 21:56

## 2017-10-17 RX ADMIN — Medication 650 MILLIGRAM(S): at 19:56

## 2017-10-17 RX ADMIN — Medication 650 MILLIGRAM(S): at 19:55

## 2017-10-17 RX ADMIN — SIMVASTATIN 10 MILLIGRAM(S): 20 TABLET, FILM COATED ORAL at 21:17

## 2017-10-17 NOTE — H&P ADULT - NSHPLABSRESULTS_GEN_ALL_CORE
< from: Echocardiogram (08.04.17 @ 13:50) >    Left ventricular hypertrophy present. The left ventricular wall motion is   normal.  The left ventricular ejection fraction is normal. The left   ventricular ejection fraction is 65%.  The left atrial size is normal.   Right   atrial size is normal.The right ventricle is normal in size and   function.Calcified aortic valve. There is Moderate aortic stenosis.The   calculated aortic valve area using the continuity equation is 1.1   cm2.The peak   pressuregradient is 38 mmHg, mean pressure gradient is 24 mmHg. No   aortic   regurgitation noted.  There was insufficient TR detected from which to   calculate pulmonary artery systolic pressure.  The inferior vena cava is   normal in size (<2.1 cm) with normal inspiratory collapse (>50%)   consistent   with normal right atrial pressure.  No aortic root dilatation.There is no   pericardial effusion.    < end of copied text >

## 2017-10-17 NOTE — H&P ADULT - HISTORY OF PRESENT ILLNESS
This is a 80 y/o female with PMHx of HTN, hyperlipidemia, NIDDM c/b neuropathy, obesity, ambulates with a cane at baseline, ?EDE, (awaiting sleep study), with known aortic stenosis on echocardiogram, non-obstructive CAD s/p diagnostic cardiac cath @ [facility] on 08/07/15 revealing normal LM, LAD/DIAG/LCx with mild luminal irregularities, 30% mid RCA, LVEF of 65%.  LVEDP of 7mmHg.  AS: Mean gradient opf 20.5mmHg.  KAYLENE of 1.46mm2.  No MR noted.  Patient was recommended for medical therapy at the time and started on ASA 81mg PO indefinitely for non obstructive CAD.  She was also recommended for AVR, however, pt remained indecisive at the time and did not want any pre-op work-up.  During follow-up visit with her cardiologist Dr. Davey c/o MARSHALL with increasing fatigue upon ambulation of less than one city block over the past few months (NYHA class III symptoms).  She has also noticed dull, left sided chest pain described as "pressure", lasting minutes, unrelated to activity, and spontaneously resolves on its own.  Denies recent PND/orthopnea, LE edema, palpitations, dizziness, or syncope.  Echo done 03/10/17 revealed no regional wall motion abnormalities,  mildly dilated left atrium, moderate to severe asymmetric hypertrophy pf the septum.  Moderate to severe aortic stenosis with KAYLENE of 0.7-0.97 (previously 1.0-1.4). Marked mitral annular calcification. Mild TR/CT, grade 2 diastolic dysfunction, LVEF of 68%.     Recent cath results: LM-normal, LAD large mild LI, midLAD 30%, mRCA 30% large dominant, RPDA small, LV hyperdynamic, LVEF 75%, LVEDP 3mmHg, no MR, moderate-severe ASAVA 1.3.    Right heart cath: RA 10mmHg, RV 38/6 (10), PA 36/7 (18), PCWP 7mmHg.    Patient seen by Dr Mcclellan's team to evaluate AS as an outpatient.     Echocardiogram wasrepeated prior to discharge. Carotid dopplers, PFTs, EKG were eventually completed once patient decided to proceed with the procedure.    Patient seen in same day holding area; Reports no changes to PMHx or medications since last seen by our team. Denies acute or current SOB, chest pain, palpitation, N/V/D, fever/chills, recent illness, or any other concerning symptoms.

## 2017-10-17 NOTE — H&P ADULT - NSHPPHYSICALEXAM_GEN_ALL_CORE
Physical Exam  CONSTITUTIONAL:                                                              WNL  NEURO:                                                                       WNL                      EYES:                                                                                WNL  ENMT:                                                                               WNL  CV:                                                                                   WNL  RESPIRATORY:                                                                 WNL  GI:                                                                                     WNL  : BENNETT + / -                                                                  WNL  MUSKULOSKELETAL:                                                       WNL  SKIN / BREAST:                                                                  WNL · Constitutional	In no acute distress; Cooperative and pleasant  · Eyes	Appears normal  · ENMT	No gross abnormalities  · Back	No deformity   · Respiratory	Breath Sounds equal & clear   · Cardiovascular Details	regular rate and rhythm   · Cardiovascular Details	positive S1; positive S2; murmur, systolic  · Gastrointestinal	Soft, non-tender  · Extremities	No cyanosis, clubbing or edema  · Carotid Pulse	2+ B/L  · Radial Pulse	2+ B/L  · Femoral Pulse	2+ B/L  · DP Pulse	1+ B/L  · PT Pulse	+ Faint pulses B/L

## 2017-10-17 NOTE — H&P ADULT - PMH
Aortic stenosis    Diabetic neuropathy    Essential hypertension  Hypertension  Glaucoma    Hyperlipidemia  Hyperlipidemia  Type 2 diabetes mellitus  Diabetes

## 2017-10-17 NOTE — BRIEF OPERATIVE NOTE - PROCEDURE
<<-----Click on this checkbox to enter Procedure TAVR, femoral approach  10/17/2017    Active  CKLIGER

## 2017-10-17 NOTE — H&P ADULT - NSHPREVIEWOFSYSTEMS_GEN_ALL_CORE
Review of Systems  CONSTITUTIONAL:  Denies Fevers / chills, sweats, fatigue, weight loss, weight gain                                      NEURO:  Denies parethesias, seizures, syncope, confusion                                                                                EYES:  Denies Blurry vision, discharge, pain, loss of vision                                                                                    ENMT:  Denies Difficulty hearing, vertigo, dysphagia, epistaxis, recent dental work                                       CV:  +Chest pain, +MARSHALL.  Denies palpitations, orthopnea                                                                                          RESPIRATORY:  +SOB.  Denies Wheezing, cough / sputum, hemoptysis                                                                GI:  Denies Nausea, vomiting, diarrhea, constipation, melena, difficulty swallowing                                               : Denies Hematuria, dysuria, urgency, incontinence                                                                                         MUSCULOSKELETAL:  Denies arthritis, joint swelling, muscle weakness                                                             SKIN/BREAST:  Denies rash, itching, hair loss, masses                                                                                            PSYCH:  Denies depression, anxiety, suicidal ideation                                                                                               HEME/LYMPH:  Denies bruises easily, enlarged lymph nodes, tender lymph nodes                                        ENDOCRINE:  Denies cold intolerance, heat intolerance, polydipsia

## 2017-10-17 NOTE — H&P ADULT - PROBLEM SELECTOR PLAN 1
Admit under Dr. Vincent via same day surgery for TAVR. Consent signed, placed on chart.  Risks/benefits reviewed, patient understands and agrees. T&S ordered and blood products placed on hold for OR.  To 9LA post-op.

## 2017-10-17 NOTE — H&P ADULT - ASSESSMENT
This is a 80 y/o female with PMHx of HTN, hyperlipidemia, NIDDM c/b neuropathy, obesity, ambulates with a cane at baseline, ?EDE, (awaiting sleep study), with known aortic stenosis on echocardiogram, non-obstructive CAD s/p diagnostic cardiac cath @ [facility] on 08/07/15 revealing normal LM, LAD/DIAG/LCx with mild luminal irregularities, 30% mid RCA, LVEF of 65%.  LVEDP of 7mmHg.  AS: Mean gradient opf 20.5mmHg.  KAYLENE of 1.46mm2.  Patient refused surgery (AVR), but has agreed for TAVR.  Here today for planned TAVR.

## 2017-10-18 LAB
ALBUMIN SERPL ELPH-MCNC: 3.4 G/DL — SIGNIFICANT CHANGE UP (ref 3.3–5)
ALP SERPL-CCNC: 60 U/L — SIGNIFICANT CHANGE UP (ref 40–120)
ALT FLD-CCNC: 8 U/L — LOW (ref 10–45)
ANION GAP SERPL CALC-SCNC: 13 MMOL/L — SIGNIFICANT CHANGE UP (ref 5–17)
APTT BLD: 32.1 SEC — SIGNIFICANT CHANGE UP (ref 27.5–37.4)
AST SERPL-CCNC: 15 U/L — SIGNIFICANT CHANGE UP (ref 10–40)
BILIRUB SERPL-MCNC: 0.5 MG/DL — SIGNIFICANT CHANGE UP (ref 0.2–1.2)
BUN SERPL-MCNC: 26 MG/DL — HIGH (ref 7–23)
CALCIUM SERPL-MCNC: 9.5 MG/DL — SIGNIFICANT CHANGE UP (ref 8.4–10.5)
CHLORIDE SERPL-SCNC: 100 MMOL/L — SIGNIFICANT CHANGE UP (ref 96–108)
CO2 SERPL-SCNC: 24 MMOL/L — SIGNIFICANT CHANGE UP (ref 22–31)
CREAT SERPL-MCNC: 1.29 MG/DL — SIGNIFICANT CHANGE UP (ref 0.5–1.3)
GAS PNL BLDA: SIGNIFICANT CHANGE UP
GLUCOSE BLDC GLUCOMTR-MCNC: 130 MG/DL — HIGH (ref 70–99)
GLUCOSE BLDC GLUCOMTR-MCNC: 138 MG/DL — HIGH (ref 70–99)
GLUCOSE BLDC GLUCOMTR-MCNC: 140 MG/DL — HIGH (ref 70–99)
GLUCOSE BLDC GLUCOMTR-MCNC: 170 MG/DL — HIGH (ref 70–99)
GLUCOSE SERPL-MCNC: 142 MG/DL — HIGH (ref 70–99)
HCT VFR BLD CALC: 28.5 % — LOW (ref 34.5–45)
HGB BLD-MCNC: 9.9 G/DL — LOW (ref 11.5–15.5)
INR BLD: 1.04 — SIGNIFICANT CHANGE UP (ref 0.88–1.16)
MAGNESIUM SERPL-MCNC: 1.7 MG/DL — SIGNIFICANT CHANGE UP (ref 1.6–2.6)
MCHC RBC-ENTMCNC: 30.6 PG — SIGNIFICANT CHANGE UP (ref 27–34)
MCHC RBC-ENTMCNC: 34.7 G/DL — SIGNIFICANT CHANGE UP (ref 32–36)
MCV RBC AUTO: 88 FL — SIGNIFICANT CHANGE UP (ref 80–100)
PHOSPHATE SERPL-MCNC: 4.5 MG/DL — SIGNIFICANT CHANGE UP (ref 2.5–4.5)
PLATELET # BLD AUTO: 252 K/UL — SIGNIFICANT CHANGE UP (ref 150–400)
POTASSIUM SERPL-MCNC: 3.7 MMOL/L — SIGNIFICANT CHANGE UP (ref 3.5–5.3)
POTASSIUM SERPL-SCNC: 3.7 MMOL/L — SIGNIFICANT CHANGE UP (ref 3.5–5.3)
PROT SERPL-MCNC: 6.6 G/DL — SIGNIFICANT CHANGE UP (ref 6–8.3)
PROTHROM AB SERPL-ACNC: 11.5 SEC — SIGNIFICANT CHANGE UP (ref 9.8–12.7)
RBC # BLD: 3.24 M/UL — LOW (ref 3.8–5.2)
RBC # FLD: 14.1 % — SIGNIFICANT CHANGE UP (ref 10.3–16.9)
SODIUM SERPL-SCNC: 137 MMOL/L — SIGNIFICANT CHANGE UP (ref 135–145)
WBC # BLD: 10 K/UL — SIGNIFICANT CHANGE UP (ref 3.8–10.5)
WBC # FLD AUTO: 10 K/UL — SIGNIFICANT CHANGE UP (ref 3.8–10.5)

## 2017-10-18 PROCEDURE — 93010 ELECTROCARDIOGRAM REPORT: CPT

## 2017-10-18 PROCEDURE — 71010: CPT | Mod: 26

## 2017-10-18 PROCEDURE — 93306 TTE W/DOPPLER COMPLETE: CPT | Mod: 26

## 2017-10-18 PROCEDURE — 99233 SBSQ HOSP IP/OBS HIGH 50: CPT

## 2017-10-18 RX ORDER — METOPROLOL TARTRATE 50 MG
12.5 TABLET ORAL EVERY 8 HOURS
Qty: 0 | Refills: 0 | Status: DISCONTINUED | OUTPATIENT
Start: 2017-10-18 | End: 2017-10-19

## 2017-10-18 RX ORDER — DOCUSATE SODIUM 100 MG
100 CAPSULE ORAL
Qty: 0 | Refills: 0 | Status: DISCONTINUED | OUTPATIENT
Start: 2017-10-18 | End: 2017-10-19

## 2017-10-18 RX ORDER — ACETAMINOPHEN 500 MG
1000 TABLET ORAL ONCE
Qty: 0 | Refills: 0 | Status: COMPLETED | OUTPATIENT
Start: 2017-10-18 | End: 2017-10-18

## 2017-10-18 RX ORDER — POTASSIUM CHLORIDE 20 MEQ
20 PACKET (EA) ORAL ONCE
Qty: 0 | Refills: 0 | Status: COMPLETED | OUTPATIENT
Start: 2017-10-18 | End: 2017-10-18

## 2017-10-18 RX ORDER — SODIUM CHLORIDE 9 MG/ML
3 INJECTION INTRAMUSCULAR; INTRAVENOUS; SUBCUTANEOUS EVERY 8 HOURS
Qty: 0 | Refills: 0 | Status: DISCONTINUED | OUTPATIENT
Start: 2017-10-18 | End: 2017-10-19

## 2017-10-18 RX ORDER — MAGNESIUM OXIDE 400 MG ORAL TABLET 241.3 MG
400 TABLET ORAL ONCE
Qty: 0 | Refills: 0 | Status: COMPLETED | OUTPATIENT
Start: 2017-10-18 | End: 2017-10-18

## 2017-10-18 RX ORDER — SENNA PLUS 8.6 MG/1
2 TABLET ORAL AT BEDTIME
Qty: 0 | Refills: 0 | Status: DISCONTINUED | OUTPATIENT
Start: 2017-10-18 | End: 2017-10-19

## 2017-10-18 RX ADMIN — Medication 20 MILLIEQUIVALENT(S): at 03:23

## 2017-10-18 RX ADMIN — CLOPIDOGREL BISULFATE 75 MILLIGRAM(S): 75 TABLET, FILM COATED ORAL at 11:55

## 2017-10-18 RX ADMIN — Medication 400 MILLIGRAM(S): at 00:25

## 2017-10-18 RX ADMIN — HEPARIN SODIUM 7500 UNIT(S): 5000 INJECTION INTRAVENOUS; SUBCUTANEOUS at 21:13

## 2017-10-18 RX ADMIN — HEPARIN SODIUM 7500 UNIT(S): 5000 INJECTION INTRAVENOUS; SUBCUTANEOUS at 17:59

## 2017-10-18 RX ADMIN — Medication 100 MILLIGRAM(S): at 23:54

## 2017-10-18 RX ADMIN — Medication 400 MILLIGRAM(S): at 10:00

## 2017-10-18 RX ADMIN — Medication 100 MILLIGRAM(S): at 18:00

## 2017-10-18 RX ADMIN — Medication 100 MILLIGRAM(S): at 00:31

## 2017-10-18 RX ADMIN — Medication 2: at 13:05

## 2017-10-18 RX ADMIN — FAMOTIDINE 20 MILLIGRAM(S): 10 INJECTION INTRAVENOUS at 11:55

## 2017-10-18 RX ADMIN — Medication 12.5 MILLIGRAM(S): at 21:13

## 2017-10-18 RX ADMIN — Medication 81 MILLIGRAM(S): at 11:55

## 2017-10-18 RX ADMIN — SODIUM CHLORIDE 3 MILLILITER(S): 9 INJECTION INTRAMUSCULAR; INTRAVENOUS; SUBCUTANEOUS at 17:49

## 2017-10-18 RX ADMIN — Medication 100 MILLIGRAM(S): at 06:47

## 2017-10-18 RX ADMIN — SODIUM CHLORIDE 3 MILLILITER(S): 9 INJECTION INTRAMUSCULAR; INTRAVENOUS; SUBCUTANEOUS at 21:24

## 2017-10-18 RX ADMIN — Medication 100 MILLIGRAM(S): at 17:59

## 2017-10-18 RX ADMIN — Medication 1000 MILLIGRAM(S): at 11:19

## 2017-10-18 RX ADMIN — Medication 1000 MILLIGRAM(S): at 00:45

## 2017-10-18 RX ADMIN — SIMVASTATIN 10 MILLIGRAM(S): 20 TABLET, FILM COATED ORAL at 21:13

## 2017-10-18 RX ADMIN — MAGNESIUM OXIDE 400 MG ORAL TABLET 400 MILLIGRAM(S): 241.3 TABLET ORAL at 03:23

## 2017-10-18 RX ADMIN — Medication 12.5 MILLIGRAM(S): at 13:39

## 2017-10-18 RX ADMIN — HEPARIN SODIUM 7500 UNIT(S): 5000 INJECTION INTRAVENOUS; SUBCUTANEOUS at 06:47

## 2017-10-18 RX ADMIN — SENNA PLUS 2 TABLET(S): 8.6 TABLET ORAL at 21:13

## 2017-10-18 NOTE — PROGRESS NOTE ADULT - SUBJECTIVE AND OBJECTIVE BOX
Patient discussed on morning rounds with Dr. Gottlieb    Operation / Date: TF TAVR (jhony) 10/17/17    SUBJECTIVE ASSESSMENT:  82y Female seen and examined.  Today patient feels a little weak from surgery.  She ambulated in hallway with PT, but has not had a BM since surgery.  She is tolerating PO diet.  She denies fever, chills, chest pain, SOB, abdominal pain, n/v/d/c, headache, dizziness    Vital Signs Last 24 Hrs  T(C): 36.4 (18 Oct 2017 09:05), Max: 36.8 (17 Oct 2017 22:06)  T(F): 97.5 (18 Oct 2017 09:05), Max: 98.2 (17 Oct 2017 22:06)  HR: 92 (18 Oct 2017 11:58) (70 - 92)  BP: 130/63 (18 Oct 2017 10:00) (130/63 - 130/63)  BP(mean): 93 (18 Oct 2017 10:00) (93 - 93)  RR: 18 (18 Oct 2017 00:13) (16 - 18)  SpO2: 99% (18 Oct 2017 11:58) (95% - 100%)  I&O's Detail    17 Oct 2017 07:01  -  18 Oct 2017 07:00  --------------------------------------------------------  IN:    IV PiggyBack: 100 mL    Oral Fluid: 120 mL    sodium chloride 0.45%: 120 mL  Total IN: 340 mL    OUT:    Voided: 640 mL  Total OUT: 640 mL    Total NET: -300 mL      18 Oct 2017 07:01  -  18 Oct 2017 13:14  --------------------------------------------------------  IN:    sodium chloride 0.45%: 10 mL  Total IN: 10 mL    OUT:  Total OUT: 0 mL    Total NET: 10 mL          CHEST TUBE:  No  MONA DRAIN:  No.  EPICARDIAL WIRES: No.  TIE DOWNS: No.  BENNETT: No.    PHYSICAL EXAM:    General: Sitting comfortably in bed, no acute distress    Neurological: Awake alert and oriented no neuro deficits     Cardiovascular: S1S2, RRR, no murmurs heard at this time     Respiratory: clear and equal breathsounds b/l, no wheeze/rhonchi/rales    Gastrointestinal: +BS soft nontender nondistended     Extremities: warm and well perfused, no edema, no calf tenderness    Vascular: 2+ radial and DP pulses bilaterally     Incision Sites: A line site: CDI.  R groin: CDI, no erythema drainage tenderness or palpable hematoma     LABS:                        9.9    10.0  )-----------( 252      ( 18 Oct 2017 01:09 )             28.5       COUMADIN:  Yes/No. REASON: .    PT/INR - ( 18 Oct 2017 01:09 )   PT: 11.5 sec;   INR: 1.04          PTT - ( 18 Oct 2017 01:09 )  PTT:32.1 sec    10-18    137  |  100  |  26<H>  ----------------------------<  142<H>  3.7   |  24  |  1.29    Ca    9.5      18 Oct 2017 01:11  Phos  4.5     10-18  Mg     1.7     10-18    TPro  6.6  /  Alb  3.4  /  TBili  0.5  /  DBili  x   /  AST  15  /  ALT  8<L>  /  AlkPhos  60  10-18          MEDICATIONS  (STANDING):  aspirin enteric coated 81 milliGRAM(s) Oral daily  ceFAZolin   IVPB 2000 milliGRAM(s) IV Intermittent every 8 hours  clopidogrel Tablet 75 milliGRAM(s) Oral daily  dextrose 5%. 1000 milliLiter(s) (50 mL/Hr) IV Continuous <Continuous>  dextrose 50% Injectable 12.5 Gram(s) IV Push once  dextrose 50% Injectable 25 Gram(s) IV Push once  famotidine    Tablet 20 milliGRAM(s) Oral daily  heparin  Injectable 7500 Unit(s) SubCutaneous every 8 hours  insulin lispro (HumaLOG) corrective regimen sliding scale   SubCutaneous Before meals and at bedtime  metoprolol 12.5 milliGRAM(s) Oral every 8 hours  simvastatin 10 milliGRAM(s) Oral at bedtime  sodium chloride 0.9% lock flush 3 milliLiter(s) IV Push every 8 hours    MEDICATIONS  (PRN):  acetaminophen   Tablet. 650 milliGRAM(s) Oral every 6 hours PRN Mild Pain (1 - 3)  dextrose Gel 1 Dose(s) Oral once PRN Blood Glucose LESS THAN 70 milliGRAM(s)/deciliter  glucagon  Injectable 1 milliGRAM(s) IntraMuscular once PRN Glucose LESS THAN 70 milligrams/deciliter        RADIOLOGY & ADDITIONAL TESTS:  < from: Xray Chest 1 View AP -PORTABLE-Routine (10.18.17 @ 04:42) >  FINDINGS: Heart size is stable. No focal consolidation or pleural   effusion. No pneumothorax. Osseous structures are intact.    IMPRESSION:  No acute disease. No significant change.    < end of copied text >    < from: 12 Lead ECG (10.18.17 @ 03:58) >  Atrial Rate 82 BPM    P-R Interval 146 ms    QRS Duration 82 ms    Q-T Interval 386 ms    QTC Calculation(Bezet) 450 ms    P Axis 37 degrees    R Axis -8 degrees    T Axis 66 degrees    < end of copied text >

## 2017-10-18 NOTE — PROGRESS NOTE ADULT - SUBJECTIVE AND OBJECTIVE BOX
SUBJECTIVE ASSESSMENT:     No chest pain, SOB.  No groin hematoma. Remains in sinus rhythm with no evidence of high-grade AV block or CHB.                                                     Vital Signs Last 24 Hrs  T(C): 36.4 (18 Oct 2017 09:05), Max: 36.8 (17 Oct 2017 22:06)  T(F): 97.5 (18 Oct 2017 09:05), Max: 98.2 (17 Oct 2017 22:06)  HR: 82 (18 Oct 2017 07:13) (70 - 92)  BP: --  BP(mean): --  RR: 18 (18 Oct 2017 00:13) (16 - 18)  SpO2: 100% (18 Oct 2017 07:13) (95% - 100%)  I&O's Detail    17 Oct 2017 07:01  -  18 Oct 2017 07:00  --------------------------------------------------------  IN:    IV PiggyBack: 100 mL    Oral Fluid: 120 mL    sodium chloride 0.45%.: 120 mL  Total IN: 340 mL    OUT:    Voided: 640 mL  Total OUT: 640 mL    Total NET: -300 mL      18 Oct 2017 07:01  -  18 Oct 2017 09:23  --------------------------------------------------------  IN:    sodium chloride 0.45%.: 10 mL  Total IN: 10 mL    OUT:  Total OUT: 0 mL    Total NET: 10 mL            PHYSICAL EXAM:    General: No acute distress.     HEENT: No JVD. No carotid bruits. No thyromegaly.    Cardiovascular: RRR, normal S1, S2. No murmur. No gallops. No rubs.     Respiratory: CTA bilaterally. No rhonchi. No wheezing. No crackles.    Gastrointestinal: Soft, not tender. Not distended. Normal bowel sounds. No hepatosplenomegaly.     Extremities: No pedal edema. Both groin: soft, no hematoma. B/L femoral pulses 2/2+    Vascular: Normal peripheral pulses.     Neurological: A&Ox3. No focal deficit.     Skin: warm.      LABS:                        9.9    10.0  )-----------( 252      ( 18 Oct 2017 01:09 )             28.5       COUMADIN:  Yes/No. REASON: .    PT/INR - ( 18 Oct 2017 01:09 )   PT: 11.5 sec;   INR: 1.04          PTT - ( 18 Oct 2017 01:09 )  PTT:32.1 sec    10-18    137  |  100  |  26<H>  ----------------------------<  142<H>  3.7   |  24  |  1.29    Ca    9.5      18 Oct 2017 01:11  Phos  4.5     10-18  Mg     1.7     10-18    TPro  6.6  /  Alb  3.4  /  TBili  0.5  /  DBili  x   /  AST  15  /  ALT  8<L>  /  AlkPhos  60  10-18          MEDICATIONS  (STANDING):  acetaminophen  IVPB. 1000 milliGRAM(s) IV Intermittent once  aspirin enteric coated 81 milliGRAM(s) Oral daily  ceFAZolin   IVPB 2000 milliGRAM(s) IV Intermittent every 8 hours  clopidogrel Tablet 75 milliGRAM(s) Oral daily  dextrose 5%. 1000 milliLiter(s) (50 mL/Hr) IV Continuous <Continuous>  dextrose 50% Injectable 12.5 Gram(s) IV Push once  dextrose 50% Injectable 25 Gram(s) IV Push once  famotidine    Tablet 20 milliGRAM(s) Oral daily  heparin  Injectable 7500 Unit(s) SubCutaneous every 8 hours  insulin lispro (HumaLOG) corrective regimen sliding scale   SubCutaneous Before meals and at bedtime  simvastatin 10 milliGRAM(s) Oral at bedtime  sodium chloride 0.45%. 1000 milliLiter(s) (10 mL/Hr) IV Continuous <Continuous>    MEDICATIONS  (PRN):  acetaminophen   Tablet. 650 milliGRAM(s) Oral every 6 hours PRN Mild Pain (1 - 3)  dextrose Gel 1 Dose(s) Oral once PRN Blood Glucose LESS THAN 70 milliGRAM(s)/deciliter  glucagon  Injectable 1 milliGRAM(s) IntraMuscular once PRN Glucose LESS THAN 70 milligrams/deciliter        RADIOLOGY & ADDITIONAL TESTS:    CXR 10/18: no acute cardiopulmonary process

## 2017-10-18 NOTE — PHYSICAL THERAPY INITIAL EVALUATION ADULT - ADDITIONAL COMMENTS
patient reports she was independent with all mobility using straight cane; family assists with ADLs as needed

## 2017-10-18 NOTE — PHYSICAL THERAPY INITIAL EVALUATION ADULT - PERTINENT HX OF CURRENT PROBLEM, REHAB EVAL
This is a 82 y/o female with PMHx of HTN, hyperlipidemia, NIDDM c/b neuropathy, obesity, ambulates with a cane at baseline, ?EDE, (awaiting sleep study), with known aortic stenosis on echocardiogram, non-obstructive CAD

## 2017-10-18 NOTE — PHYSICAL THERAPY INITIAL EVALUATION ADULT - GENERAL OBSERVATIONS, REHAB EVAL
Patient received seated out of bed no acute distress +telemetry +[place] +IV heplock +room air. Patient left as found all lines intact +call TRESA santiago.

## 2017-10-19 ENCOUNTER — TRANSCRIPTION ENCOUNTER (OUTPATIENT)
Age: 82
End: 2017-10-19

## 2017-10-19 VITALS — WEIGHT: 225.97 LBS

## 2017-10-19 LAB
ANION GAP SERPL CALC-SCNC: 13 MMOL/L — SIGNIFICANT CHANGE UP (ref 5–17)
BUN SERPL-MCNC: 26 MG/DL — HIGH (ref 7–23)
CALCIUM SERPL-MCNC: 9.6 MG/DL — SIGNIFICANT CHANGE UP (ref 8.4–10.5)
CHLORIDE SERPL-SCNC: 97 MMOL/L — SIGNIFICANT CHANGE UP (ref 96–108)
CO2 SERPL-SCNC: 25 MMOL/L — SIGNIFICANT CHANGE UP (ref 22–31)
CREAT SERPL-MCNC: 1.36 MG/DL — HIGH (ref 0.5–1.3)
GLUCOSE BLDC GLUCOMTR-MCNC: 157 MG/DL — HIGH (ref 70–99)
GLUCOSE BLDC GLUCOMTR-MCNC: 171 MG/DL — HIGH (ref 70–99)
GLUCOSE SERPL-MCNC: 154 MG/DL — HIGH (ref 70–99)
HCT VFR BLD CALC: 28 % — LOW (ref 34.5–45)
HGB BLD-MCNC: 9.5 G/DL — LOW (ref 11.5–15.5)
MAGNESIUM SERPL-MCNC: 1.8 MG/DL — SIGNIFICANT CHANGE UP (ref 1.6–2.6)
MCHC RBC-ENTMCNC: 29.9 PG — SIGNIFICANT CHANGE UP (ref 27–34)
MCHC RBC-ENTMCNC: 33.9 G/DL — SIGNIFICANT CHANGE UP (ref 32–36)
MCV RBC AUTO: 88.1 FL — SIGNIFICANT CHANGE UP (ref 80–100)
PLATELET # BLD AUTO: 250 K/UL — SIGNIFICANT CHANGE UP (ref 150–400)
POTASSIUM SERPL-MCNC: 3.9 MMOL/L — SIGNIFICANT CHANGE UP (ref 3.5–5.3)
POTASSIUM SERPL-SCNC: 3.9 MMOL/L — SIGNIFICANT CHANGE UP (ref 3.5–5.3)
RBC # BLD: 3.18 M/UL — LOW (ref 3.8–5.2)
RBC # FLD: 14 % — SIGNIFICANT CHANGE UP (ref 10.3–16.9)
SODIUM SERPL-SCNC: 135 MMOL/L — SIGNIFICANT CHANGE UP (ref 135–145)
WBC # BLD: 9.4 K/UL — SIGNIFICANT CHANGE UP (ref 3.8–10.5)
WBC # FLD AUTO: 9.4 K/UL — SIGNIFICANT CHANGE UP (ref 3.8–10.5)

## 2017-10-19 PROCEDURE — 99233 SBSQ HOSP IP/OBS HIGH 50: CPT

## 2017-10-19 PROCEDURE — 71010: CPT | Mod: 26

## 2017-10-19 PROCEDURE — 93010 ELECTROCARDIOGRAM REPORT: CPT

## 2017-10-19 RX ORDER — METOPROLOL TARTRATE 50 MG
25 TABLET ORAL EVERY 8 HOURS
Qty: 0 | Refills: 0 | Status: DISCONTINUED | OUTPATIENT
Start: 2017-10-19 | End: 2017-10-19

## 2017-10-19 RX ORDER — METOPROLOL TARTRATE 50 MG
1.5 TABLET ORAL
Qty: 45 | Refills: 0 | OUTPATIENT
Start: 2017-10-19 | End: 2017-11-18

## 2017-10-19 RX ORDER — ACETAMINOPHEN 500 MG
2 TABLET ORAL
Qty: 240 | Refills: 0 | OUTPATIENT
Start: 2017-10-19 | End: 2017-11-18

## 2017-10-19 RX ORDER — LOSARTAN POTASSIUM 100 MG/1
100 TABLET, FILM COATED ORAL DAILY
Qty: 0 | Refills: 0 | Status: DISCONTINUED | OUTPATIENT
Start: 2017-10-19 | End: 2017-10-19

## 2017-10-19 RX ORDER — METOPROLOL TARTRATE 50 MG
50 TABLET ORAL ONCE
Qty: 0 | Refills: 0 | Status: COMPLETED | OUTPATIENT
Start: 2017-10-19 | End: 2017-10-19

## 2017-10-19 RX ORDER — NIFEDIPINE 30 MG
2 TABLET, EXTENDED RELEASE 24 HR ORAL
Qty: 0 | Refills: 0 | COMMUNITY

## 2017-10-19 RX ORDER — HYDROCHLOROTHIAZIDE 25 MG
25 TABLET ORAL DAILY
Qty: 0 | Refills: 0 | Status: DISCONTINUED | OUTPATIENT
Start: 2017-10-19 | End: 2017-10-19

## 2017-10-19 RX ORDER — METOPROLOL TARTRATE 50 MG
1 TABLET ORAL
Qty: 0 | Refills: 0 | COMMUNITY

## 2017-10-19 RX ORDER — LACTULOSE 10 G/15ML
10 SOLUTION ORAL ONCE
Qty: 0 | Refills: 0 | Status: COMPLETED | OUTPATIENT
Start: 2017-10-19 | End: 2017-10-19

## 2017-10-19 RX ORDER — DOCUSATE SODIUM 100 MG
1 CAPSULE ORAL
Qty: 60 | Refills: 0 | OUTPATIENT
Start: 2017-10-19 | End: 2017-11-18

## 2017-10-19 RX ORDER — METOPROLOL TARTRATE 50 MG
1 TABLET ORAL
Qty: 30 | Refills: 0 | OUTPATIENT
Start: 2017-10-19 | End: 2017-11-18

## 2017-10-19 RX ORDER — FAMOTIDINE 10 MG/ML
1 INJECTION INTRAVENOUS
Qty: 30 | Refills: 0 | OUTPATIENT
Start: 2017-10-19 | End: 2017-11-18

## 2017-10-19 RX ORDER — CLOPIDOGREL BISULFATE 75 MG/1
1 TABLET, FILM COATED ORAL
Qty: 30 | Refills: 0 | OUTPATIENT
Start: 2017-10-19 | End: 2017-11-18

## 2017-10-19 RX ORDER — SENNA PLUS 8.6 MG/1
2 TABLET ORAL
Qty: 60 | Refills: 0 | OUTPATIENT
Start: 2017-10-19 | End: 2017-11-18

## 2017-10-19 RX ADMIN — SODIUM CHLORIDE 3 MILLILITER(S): 9 INJECTION INTRAMUSCULAR; INTRAVENOUS; SUBCUTANEOUS at 06:59

## 2017-10-19 RX ADMIN — LOSARTAN POTASSIUM 100 MILLIGRAM(S): 100 TABLET, FILM COATED ORAL at 08:54

## 2017-10-19 RX ADMIN — Medication 2: at 11:43

## 2017-10-19 RX ADMIN — CLOPIDOGREL BISULFATE 75 MILLIGRAM(S): 75 TABLET, FILM COATED ORAL at 11:44

## 2017-10-19 RX ADMIN — HEPARIN SODIUM 7500 UNIT(S): 5000 INJECTION INTRAVENOUS; SUBCUTANEOUS at 07:04

## 2017-10-19 RX ADMIN — FAMOTIDINE 20 MILLIGRAM(S): 10 INJECTION INTRAVENOUS at 11:43

## 2017-10-19 RX ADMIN — Medication 100 MILLIGRAM(S): at 06:55

## 2017-10-19 RX ADMIN — Medication 50 MILLIGRAM(S): at 12:25

## 2017-10-19 RX ADMIN — Medication 25 MILLIGRAM(S): at 08:54

## 2017-10-19 RX ADMIN — Medication 81 MILLIGRAM(S): at 11:44

## 2017-10-19 RX ADMIN — LACTULOSE 10 GRAM(S): 10 SOLUTION ORAL at 11:27

## 2017-10-19 NOTE — DISCHARGE NOTE ADULT - HOSPITAL COURSE
This is an 82-year-old Female with history of HTN, HLD, NIDDM with peripheral neuropathy, obesity, non-obstructive CAD s/p cardiac cath in 8/15 and known AS followed with serial echocardiography who recently presented to Dr. Davey (her cardiologist) for increasing SOB/MARSHALL with NYHA Class III symptoms.  She underwent repeat ECHO which revealed moderate to severe AS with KAYLENE 0.7-0.9cm2 (increased from prior studies showing 1.0-1.4cm2), mild TR/MS, grade 2 diastolic dysfunction with EF 68%. Repeat outpatient cardiac cath once again showed non-obstructive CAD and she was referred to Dr. Patricio Mcclellan for surgical evaluation of Severe AS.  After outpatient evaluation, she refused surgical intervention but was receptive to TAVR and was subsequently evaluated by Dr. Cristian Vincent as well.  She completed pre-procedure evaluation and was admitted to [facility] on 10/17/17 where she underwent uncomplicated TF TAVR (Jhony valve).  She arrived to CTICU extubated in stable condition.  POD 1, BB started.  Post-procedure ECHO revealed jhony valve in correct position, no AR, trace TR/MR, EF 72% with grade I diastolic dysfunction and no pericardial effusion.  EKG showed NSR with QRS 82ms.  She was transferred to step down floor where she continued to progress very well.  On 10/19/17, she is POD 2 s/p TAVR and continues to do well.  Tolerating RA without difficulty, HR controlled and BP stable.  She was evaluated in AM and medically cleared by Dr. Vincent for discharge to home today with plan for outpatient follow-up.  Medications and instructions discussed at length prior to discharge.

## 2017-10-19 NOTE — DIETITIAN INITIAL EVALUATION ADULT. - NS AS NUTRI INTERV ED CONTENT
Recommended modifications/Survival information/Dash/TLC, CST CHO diet edu/Priority modifications/Nutrition relationship to health/disease/Purpose of the nutrition education

## 2017-10-19 NOTE — DISCHARGE NOTE ADULT - MEDICATION SUMMARY - MEDICATIONS TO CHANGE
I will SWITCH the dose or number of times a day I take the medications listed below when I get home from the hospital:    Toprol- mg oral tablet, extended release  -- 1 tab(s) by mouth once a day I will SWITCH the dose or number of times a day I take the medications listed below when I get home from the hospital:  None

## 2017-10-19 NOTE — DIETITIAN INITIAL EVALUATION ADULT. - PERTINENT MEDS FT
aspirin, heparin, plavix, hydrochlorothiazide, lactulose, lopressor, colace, senna, D5, D50, dex gel, glucagon, SSI, pepcid, zocor

## 2017-10-19 NOTE — DISCHARGE NOTE ADULT - CARE PROVIDER_API CALL
Cristian Vincent), Cardiology; Internal Medicine; Interventional Cardiology  [address]  [address] [place] [address]  Phone: [phone]  Fax: [phone]    Sujit Davey), Cardiovascular Disease; Interventional Cardiology  [address] [address]  Phone: [phone]  Fax: [phone]

## 2017-10-19 NOTE — DISCHARGE NOTE ADULT - MEDICATION SUMMARY - MEDICATIONS TO STOP TAKING
I will STOP taking the medications listed below when I get home from the hospital:    Procardia XL 60 mg oral tablet, extended release  -- 2 tab(s) by mouth once a day

## 2017-10-19 NOTE — DISCHARGE NOTE ADULT - MEDICATION SUMMARY - MEDICATIONS TO TAKE
I will START or STAY ON the medications listed below when I get home from the hospital:    Ecotrin Adult Low Strength 81 mg oral delayed release tablet  -- 1 tab(s) by mouth once a day  -- Indication: For Blood thinner    acetaminophen 325 mg oral tablet  -- 2 tab(s) by mouth every 6 hours, As needed, Mild Pain (1 - 3) MDD:8  -- Indication: For Pain    metFORMIN 500 mg oral tablet  -- 1 tab(s) by mouth 2 times a day  -- Indication: For Diabetes    simvastatin 10 mg oral tablet  -- 1 tab(s) by mouth once a day (at bedtime)  -- Indication: For Cholesterol    losartan-hydrochlorothiazide 100mg-25mg oral tablet  -- 1 tab(s) by mouth once a day  -- Indication: For Blood pressure    clopidogrel 75 mg oral tablet  -- 1 tab(s) by mouth once a day  -- Indication: For Blood thinner    Lopressor 50 mg oral tablet  -- 1.5 tab(s) by mouth once a day   -- It is very important that you take or use this exactly as directed.  Do not skip doses or discontinue unless directed by your doctor.  May cause drowsiness.  Alcohol may intensify this effect.  Use care when operating dangerous machinery.  Some non-prescription drugs may aggravate your condition.  Read all labels carefully.  If a warning appears, check with your doctor before taking.  Take with food or milk.  This drug may impair the ability to drive or operate machinery.  Use care until you become familiar with its effects.    -- Indication: For Blood pressure- 75mg Daily    famotidine 20 mg oral tablet  -- 1 tab(s) by mouth once a day  -- Indication: For Stomach health    docusate sodium 100 mg oral capsule  -- 1 cap(s) by mouth 2 times a day, As Needed -for constipation   -- Indication: For Stool softener    senna oral tablet  -- 2 tab(s) by mouth once a day (at bedtime), As Needed -for constipation   -- Indication: For Stool softener    Travatan 0.004% ophthalmic solution  -- 1 drop(s) to each affected eye once a day (in the evening)  -- Indication: For Eye drops I will START or STAY ON the medications listed below when I get home from the hospital:    Ecotrin Adult Low Strength 81 mg oral delayed release tablet  -- 1 tab(s) by mouth once a day  -- Indication: For Blood thinner    acetaminophen 325 mg oral tablet  -- 2 tab(s) by mouth every 6 hours, As needed, Mild Pain (1 - 3) MDD:8  -- Indication: For Pain    metFORMIN 500 mg oral tablet  -- 1 tab(s) by mouth 2 times a day  -- Indication: For Diabetes    simvastatin 10 mg oral tablet  -- 1 tab(s) by mouth once a day (at bedtime)  -- Indication: For Cholesterol    losartan-hydrochlorothiazide 100mg-25mg oral tablet  -- 1 tab(s) by mouth once a day  -- Indication: For Blood pressure    clopidogrel 75 mg oral tablet  -- 1 tab(s) by mouth once a day  -- Indication: For Blood thinner    Toprol- mg oral tablet, extended release  -- 1 tab(s) by mouth once a day  -- Indication: For Blood pressure    famotidine 20 mg oral tablet  -- 1 tab(s) by mouth once a day  -- Indication: For Stomach health    docusate sodium 100 mg oral capsule  -- 1 cap(s) by mouth 2 times a day, As Needed -for constipation   -- Indication: For Stool softener    senna oral tablet  -- 2 tab(s) by mouth once a day (at bedtime), As Needed -for constipation   -- Indication: For Stool softener    Travatan 0.004% ophthalmic solution  -- 1 drop(s) to each affected eye once a day (in the evening)  -- Indication: For Eye drops

## 2017-10-19 NOTE — PROGRESS NOTE ADULT - ASSESSMENT
-Please follow up with Dr. Vincent on 10/23/17 at 9:30am.  The office is located at [facility], [facility], 4th floor. Call us with any questions #[phone].    Please contact Dr. Sujit Davey's office to schedule a follow-up appointment within 2-4 weeks of discharge.  His contact information is listed in your discharge paperwork.     -Please follow-up with your primary care provider within 2-4 weeks of discharge to discuss your hospital stay.
A/P:  82 year old female with PMHx HTN HLD, NIDDMw/ neuropathy, obesity, known AS referred to Dr Mcclellan as an outpatient and admitted to [facility] on 10/17/17 for elective TAVR with jhony valve.  OR course uncomplicated. Patient arrived to [facility] as mini step down.  Today POD1 andrez removed, echo and EKG performed and brought down to floor care.  Patient started on BB.      Neurovascular: No delirium. Pain well controlled with current regimen  -continue tylenol PRN     Cardiovascular: Hemodynamically stable. HR controlled.  s/p TF TAVR- continue ASA 81mg daily, Plavix 75mg daily x 3 months  -EKG- NSR, QHS 82ms  Echo- valve in position, No AR, mean gradient 15mmHg EF 72%  -started metoprolol 12.5mg q8, monitor/titrate as tolerated  -continue simvastatin 10mg QHS    Respiratory: 02 Sat = 99% on RA.  -Encourage ambulation, C+DB and Use of IS 10x / hr while awake.  -CXR- stable, f/u am CXR    GI: Stable.  -continue famotidine for GI PPX.  -starting on colace and senna for bowel regimen  -continue PO Diet.    Renal / : BUN/Cr 26/1.29  -Monitor renal function.  -Monitor I/O's.    Endocrine:    -A1c 6.7- continue ISS     Hematologic: H&H 9.9/28.5  -f/u AM CBC.  -Coagulation Panel.    ID: afebrile, WBC 10  -continue perioperative abx  -Observe for SIRS/Sepsis Syndrome.    Prophylaxis:  -DVT prophylaxis with 7500 SubQ Heparin q8h.  -SCD's    Disposition:  -home when medically ready
81 y/o female with PMHx of HTN, hyperlipidemia, NIDDM c/b neuropathy, obesity, nonobstructive CAD,severe AS s/p successful TF-TAVR with a 23 mm SP3 THV (+1 cc) on 10/17/17    1) Severe AS s/p  successful TF-TAVR with a 23 mm SP3 THV (+1 cc).  - POD 1  - Continue post TAVR care.  - Stable.   - Continue ASA 81 mg daily indefinitely.  - Continue Plavix 75 mg daily x 3 months.  - TTE today.    2) HTN  - Controlled.    3) Hyperlipidemia  - Continue statin    4) DM  - SSS.  - Hold metformin x 48 hrs post procedure.     Anticipate home tomorrow if remains stable.

## 2017-10-19 NOTE — DISCHARGE NOTE ADULT - PLAN OF CARE
Recover from Hospital Stay -Please follow up with Dr. Vincent on ___.  The office is located at [facility], [facility], 4th floor. Call us with any questions  #[phone].    -Walk daily as tolerated and use your incentive spirometer every hour.    -No driving or strenuous activity/exercise for 6 weeks, or until  cleared by your surgeon.    -Gently clean your incisions with anti-bacterial soap and water, pat  dry.  You may leave them open to air.    -Call your doctor if you have shortness of breath, chest pain not  relieved by pain medication, dizziness, fever >101.5, or increased  redness or drainage from incisions. Additional Follow-up Appointments: -Please contact Dr. Sujit Davey's office to schedule a follow-up appointment within 2-4 weeks of discharge.  His contact information is listed in your discharge paperwork.     -Please follow-up with your primary care provider within 2-4 weeks of discharge to discuss your hospital stay. -Please follow up with Dr. Vincent on 10/23/17 at 9:30am.  The office is located at [facility], [facility], 4th floor. Call us with any questions  #[phone].    -Walk daily as tolerated and use your incentive spirometer every hour.    -No driving or strenuous activity/exercise for 6 weeks, or until  cleared by your surgeon.    -Gently clean your incisions with anti-bacterial soap and water, pat  dry.  You may leave them open to air.    -Call your doctor if you have shortness of breath, chest pain not  relieved by pain medication, dizziness, fever >101.5, or increased  redness or drainage from incisions.

## 2017-10-19 NOTE — DIETITIAN INITIAL EVALUATION ADULT. - ADHERENCE
poor/Pt reports meeting with a nutritionist and having knowledge over restrictions, but doesn't follow

## 2017-10-19 NOTE — DISCHARGE NOTE ADULT - CARE PROVIDERS DIRECT ADDRESSES
[email or web address]

## 2017-10-19 NOTE — DISCHARGE NOTE ADULT - PATIENT PORTAL LINK FT
“You can access the [facility] Patient Portal, offered by [facility], by registering with the following website: [email or web address]”

## 2017-10-19 NOTE — DISCHARGE NOTE ADULT - CARE PLAN
Principal Discharge DX:	Aortic stenosis  Goal:	Recover from Hospital Stay  Instructions for follow-up, activity and diet:	-Please follow up with Dr. Vincent on ___.  The office is located at [facility], 4th floor. Call us with any questions  #[phone].    -Walk daily as tolerated and use your incentive spirometer every hour.    -No driving or strenuous activity/exercise for 6 weeks, or until  cleared by your surgeon.    -Gently clean your incisions with anti-bacterial soap and water, pat  dry.  You may leave them open to air.    -Call your doctor if you have shortness of breath, chest pain not  relieved by pain medication, dizziness, fever >101.5, or increased  redness or drainage from incisions.  Goal:	Additional Follow-up Appointments:  Instructions for follow-up, activity and diet:	-Please contact Dr. Sujit Davey's office to schedule a follow-up appointment within 2-4 weeks of discharge.  His contact information is listed in your discharge paperwork.     -Please follow-up with your primary care provider within 2-4 weeks of discharge to discuss your hospital stay. Principal Discharge DX:	Aortic stenosis  Goal:	Recover from Hospital Stay  Instructions for follow-up, activity and diet:	-Please follow up with Dr. Vincent on 10/23/17 at 9:30am.  The office is located at [facility], [facility], 4th floor. Call us with any questions  #[phone].    -Walk daily as tolerated and use your incentive spirometer every hour.    -No driving or strenuous activity/exercise for 6 weeks, or until  cleared by your surgeon.    -Gently clean your incisions with anti-bacterial soap and water, pat  dry.  You may leave them open to air.    -Call your doctor if you have shortness of breath, chest pain not  relieved by pain medication, dizziness, fever >101.5, or increased  redness or drainage from incisions.  Goal:	Additional Follow-up Appointments:  Instructions for follow-up, activity and diet:	-Please contact Dr. Sujit Davey's office to schedule a follow-up appointment within 2-4 weeks of discharge.  His contact information is listed in your discharge paperwork.     -Please follow-up with your primary care provider within 2-4 weeks of discharge to discuss your hospital stay.

## 2017-10-19 NOTE — DIETITIAN INITIAL EVALUATION ADULT. - OTHER INFO
81y/o F s/p TF TAVR. Pt seen resting in bed. She reports a decreased appetite and attributes it to C. First BM this am and pt endorses feeling much better. Denies N/V/D/C, pain, and mechanical issues with po intake. PTA pt reports eating well and denies wt changes. She states that she met prior with a nutritionist for diet education, but does not follow restrictions. RD reinforced Dash/TLC, CST CHO edu and encouraged compliance. Pt understand importance of compliance and states want to make changes. Will follow.

## 2017-10-19 NOTE — DIETITIAN INITIAL EVALUATION ADULT. - ENERGY NEEDS
IBW 56.8Kg  %%  BMI 37.6    Utilized IBW to calculate needs, pt >120% of IBW. Adjusted for obesity and post-op.

## 2017-10-19 NOTE — PROGRESS NOTE ADULT - SUBJECTIVE AND OBJECTIVE BOX
Patient discussed on morning rounds with Dr. Vincent      Operation / Date: TF TAVR (Jhony) on 10/17/17    Surgeon: Dr. Vincent    Referring Physician: Dr. Davey    SUBJECTIVE ASSESSMENT:  82y Female seen at bedside this AM.  She currently states that she has not yet had a BM and is having some mild abdominal discomfort, endorses flatulence.  Denies any acute overnight events, HA, AMS, CP, palpitations, SOB, n/v/d, fever and states that she feels ready to go home today.     Hospital Course:  This is an 82-year-old Female with history of HTN, HLD, NIDDM with peripheral neuropathy, obesity, non-obstructive CAD s/p cardiac cath in 8/15 and known AS followed with serial echocardiography who recently presented to Dr. Davey (her cardiologist) for increasing SOB/MARSHALL with NYHA Class III symptoms.  She underwent repeat ECHO which revealed moderate to severe AS with KAYLENE 0.7-0.9cm2 (increased from prior studies showing 1.0-1.4cm2), mild TR/UT, grade 2 diastolic dysfunction with EF 68%. Repeat outpatient cardiac cath once again showed non-obstructive CAD and she was referred to Dr. Patricio Mcclellan for surgical evaluation of Severe AS.  After outpatient evaluation, she refused surgical intervention but was receptive to TAVR and was subsequently evaluated by Dr. Cristian Vincent as well.  She completed pre-procedure evaluation and was admitted to [facility] on 10/17/17 where she underwent uncomplicated TF TAVR (Jhony valve).  She arrived to CTICU extubated in stable condition.  POD 1, BB started.  Post-procedure ECHO revealed jhony valve in correct position, no AR, trace TR/MR, EF 72% with grade I diastolic dysfunction and no pericardial effusion.  EKG showed NSR with QRS 82ms.  She was transferred to step down floor where she continued to progress very well.  On 10/19/17, she is POD 2 s/p TAVR and continues to do well.  Tolerating RA without difficulty, HR controlled and BP stable.  She was evaluated in AM and medically cleared by Dr. Vincent for discharge to home today with plan for outpatient follow-up.  Medications and instructions discussed at length prior to discharge.       Vital Signs Last 24 Hrs  T(C): 36.4 (19 Oct 2017 09:10), Max: 36.8 (19 Oct 2017 00:53)  T(F): 97.5 (19 Oct 2017 09:10), Max: 98.2 (19 Oct 2017 00:53)  HR: 88 (19 Oct 2017 08:45) (88 - 114)  BP: 117/55 (19 Oct 2017 08:45) (117/55 - 155/67)  BP(mean): 75 (19 Oct 2017 08:45) (68 - 103)  RR: 20 (19 Oct 2017 08:45) (16 - 20)  SpO2: 96% (19 Oct 2017 08:45) (96% - 99%)  I&O's Detail    18 Oct 2017 07:01  -  19 Oct 2017 07:00  --------------------------------------------------------  IN:    IV PiggyBack: 200 mL    Oral Fluid: 1100 mL    sodium chloride 0.45%: 10 mL  Total IN: 1310 mL    OUT:    Voided: 500 mL  Total OUT: 500 mL    Total NET: 810 mL      EPICARDIAL WIRES REMOVED: NA.  TIE DOWNS REMOVED: NA.    PHYSICAL EXAM:    General: Resting comfortably in bed, no acute distress.     Neurological: AAOx3, no AMS or focal deficits.     Cardiovascular: RRR, S1/S2, no m/r/g.     Respiratory: No acute distress on RA.  CTA b/l, no w/r/r.     Gastrointestinal: ND, NBS, +mild TTP in LLQ.     Extremities: Warm and well perfused, no calf ttp or edema b/l.     Vascular: Pulses 2+    Incision Sites: R groin cath site: C/D/I, mild TTP, no palpable hematoma.     LABS:                        9.5    9.4   )-----------( 250      ( 19 Oct 2017 06:49 )             28.0       COUMADIN:  No.   PT/INR - ( 18 Oct 2017 01:09 )   PT: 11.5 sec;   INR: 1.04          PTT - ( 18 Oct 2017 01:09 )  PTT:32.1 sec    10-19    135  |  97  |  26<H>  ----------------------------<  154<H>  3.9   |  25  |  1.36<H>    Ca    9.6      19 Oct 2017 06:49  Phos  4.5     10-18  Mg     1.8     10-19    TPro  6.6  /  Alb  3.4  /  TBili  0.5  /  DBili  x   /  AST  15  /  ALT  8<L>  /  AlkPhos  60  10-18    MEDICATIONS  (STANDING):  aspirin enteric coated 81 milliGRAM(s) Oral daily  clopidogrel Tablet 75 milliGRAM(s) Oral daily  dextrose 5%. 1000 milliLiter(s) (50 mL/Hr) IV Continuous <Continuous>  dextrose 50% Injectable 12.5 Gram(s) IV Push once  dextrose 50% Injectable 25 Gram(s) IV Push once  docusate sodium 100 milliGRAM(s) Oral two times a day  famotidine    Tablet 20 milliGRAM(s) Oral daily  heparin  Injectable 7500 Unit(s) SubCutaneous every 8 hours  hydrochlorothiazide 25 milliGRAM(s) Oral daily  insulin lispro (HumaLOG) corrective regimen sliding scale   SubCutaneous Before meals and at bedtime  lactulose Syrup 10 Gram(s) Oral once  losartan 100 milliGRAM(s) Oral daily  metoprolol 25 milliGRAM(s) Oral every 8 hours  senna 2 Tablet(s) Oral at bedtime  simvastatin 10 milliGRAM(s) Oral at bedtime  sodium chloride 0.9% lock flush 3 milliLiter(s) IV Push every 8 hours      Discharge CXR:  < from: Xray Chest 1 View AP -PORTABLE-Routine (10.18.17 @ 04:42) >  INTERPRETATION:    Portable AP Radiograph dated 10/18/2017 4:42 AM    CLINICAL INFORMATION: 82 years, Female, Follow Up.    PRIOR STUDIES: October 17, 2017    FINDINGS: Heart size is stable. No focal consolidation or pleural   effusion. No pneumothorax. Osseous structures are intact.    IMPRESSION:  No acute disease. No significant change.    < end of copied text >      Discharge ECHO:  < from: Echocardiogram (10.18.17 @ 11:44) >  Interpretation Summary  Normal biatrial  size. Jhony valve in aortic position is well seated . No   aortic regurgitation noted. The calculated  EOA is 1.8 cm2, EOAI is 0.8   cm2/m2. The dimensionless index (ratio of LVOTvelocity to aortic   velocity) was   calculated to be 0.58. The mean pressure gradient is 15 mmHg.  There is   mild   mitral valve thickening. There is trace mitral regurgitation.Structurally   normal tricuspid valve. There is trace tricuspid regurgitation. There was   insufficient TR detected from which to calculate pulmonary artery   systolic   pressure.  The pulmonic valve is not well visualized. No pulmonic   regurgitation noted.The right ventricle is normal in size and   function.There   is severe concentric left ventricular hypertrophy. The left ventricular   wall   motion is normal. The left ventricular ejection fraction is 72%. Grade I   diastolic dysfunction.  No aortic root dilatation.There is no pericardial   effusion.When compared to prior study performed on 8/4/17, the Jhony   valve   at aortic position is new.    < end of copied text > Patient discussed on morning rounds with Dr. Vincent      Operation / Date: TF TAVR (Jhony) on 10/17/17    Surgeon: Dr. Vincent    Referring Physician: Dr. Davey    SUBJECTIVE ASSESSMENT:  82y Female seen at bedside this AM.  She currently states that she has not yet had a BM and is having some mild abdominal discomfort, endorses flatulence.  Denies any acute overnight events, HA, AMS, CP, palpitations, SOB, n/v/d, fever and states that she feels ready to go home today.     Hospital Course:  This is an 82-year-old Female with history of HTN, HLD, NIDDM with peripheral neuropathy, obesity, non-obstructive CAD s/p cardiac cath in 8/15 and known AS followed with serial echocardiography who recently presented to Dr. Davey (her cardiologist) for increasing SOB/MARSHALL with NYHA Class III symptoms.  She underwent repeat ECHO which revealed moderate to severe AS with KAYLENE 0.7-0.9cm2 (increased from prior studies showing 1.0-1.4cm2), mild TR/NE, grade 2 diastolic dysfunction with EF 68%. Repeat outpatient cardiac cath once again showed non-obstructive CAD and she was referred to Dr. Patrciio Mcclellan for surgical evaluation of Severe AS.  After outpatient evaluation, she refused surgical intervention but was receptive to TAVR and was subsequently evaluated by Dr. Cristian Vincent as well.  She completed pre-procedure evaluation and was admitted to [facility] on 10/17/17 where she underwent uncomplicated TF TAVR (Jhony valve).  She arrived to CTICU extubated in stable condition.  POD 1, BB started.  Post-procedure ECHO revealed jhony valve in correct position, no AR, trace TR/MR, EF 72% with grade I diastolic dysfunction and no pericardial effusion.  EKG showed NSR with QRS 82ms.  She was transferred to step down floor where she continued to progress very well.  On 10/19/17, she is POD 2 s/p TAVR and continues to do well.  Tolerating RA without difficulty, HR controlled and BP stable.  She was evaluated in AM and medically cleared by Dr. Vincent for discharge to home today with plan for outpatient follow-up.  Medications and instructions discussed at length prior to discharge.       Vital Signs Last 24 Hrs  T(C): 36.4 (19 Oct 2017 09:10), Max: 36.8 (19 Oct 2017 00:53)  T(F): 97.5 (19 Oct 2017 09:10), Max: 98.2 (19 Oct 2017 00:53)  HR: 88 (19 Oct 2017 08:45) (88 - 114)  BP: 117/55 (19 Oct 2017 08:45) (117/55 - 155/67)  BP(mean): 75 (19 Oct 2017 08:45) (68 - 103)  RR: 20 (19 Oct 2017 08:45) (16 - 20)  SpO2: 96% (19 Oct 2017 08:45) (96% - 99%)  I&O's Detail    18 Oct 2017 07:01  -  19 Oct 2017 07:00  --------------------------------------------------------  IN:    IV PiggyBack: 200 mL    Oral Fluid: 1100 mL    sodium chloride 0.45%: 10 mL  Total IN: 1310 mL    OUT:    Voided: 500 mL  Total OUT: 500 mL    Total NET: 810 mL      EPICARDIAL WIRES REMOVED: NA.  TIE DOWNS REMOVED: NA.    PHYSICAL EXAM:    General: Resting comfortably in bed, no acute distress.     Neurological: AAOx3, no AMS or focal deficits.     Cardiovascular: RRR, S1/S2, no m/r/g.     Respiratory: No acute distress on RA.  CTA b/l, no w/r/r.     Gastrointestinal: ND, NBS, +mild TTP in LLQ.     Extremities: Warm and well perfused, no calf ttp or edema b/l.     Vascular: Pulses 2+    Incision Sites: R groin cath site: C/D/I, mild TTP, no palpable hematoma.     LABS:                        9.5    9.4   )-----------( 250      ( 19 Oct 2017 06:49 )             28.0       COUMADIN:  No.   PT/INR - ( 18 Oct 2017 01:09 )   PT: 11.5 sec;   INR: 1.04          PTT - ( 18 Oct 2017 01:09 )  PTT:32.1 sec    10-19    135  |  97  |  26<H>  ----------------------------<  154<H>  3.9   |  25  |  1.36<H>    Ca    9.6      19 Oct 2017 06:49  Phos  4.5     10-18  Mg     1.8     10-19    TPro  6.6  /  Alb  3.4  /  TBili  0.5  /  DBili  x   /  AST  15  /  ALT  8<L>  /  AlkPhos  60  10-18    MEDICATIONS  (STANDING):  I will START or STAY ON the medications listed below when I get home from the hospital:    Ecotrin Adult Low Strength 81 mg oral delayed release tablet  -- 1 tab(s) by mouth once a day  -- Indication: For Blood thinner    acetaminophen 325 mg oral tablet  -- 2 tab(s) by mouth every 6 hours, As needed, Mild Pain (1 - 3) MDD:8  -- Indication: For Pain    metFORMIN 500 mg oral tablet  -- 1 tab(s) by mouth 2 times a day  -- Indication: For Diabetes    simvastatin 10 mg oral tablet  -- 1 tab(s) by mouth once a day (at bedtime)  -- Indication: For Cholesterol    losartan-hydrochlorothiazide 100mg-25mg oral tablet  -- 1 tab(s) by mouth once a day  -- Indication: For Blood pressure    clopidogrel 75 mg oral tablet  -- 1 tab(s) by mouth once a day  -- Indication: For Blood thinner    Lopressor 50 mg oral tablet  -- 1.5 tab(s) by mouth once a day   -- It is very important that you take or use this exactly as directed.  Do not skip doses or discontinue unless directed by your doctor.  May cause drowsiness.  Alcohol may intensify this effect.  Use care when operating dangerous machinery.  Some non-prescription drugs may aggravate your condition.  Read all labels carefully.  If a warning appears, check with your doctor before taking.  Take with food or milk.  This drug may impair the ability to drive or operate machinery.  Use care until you become familiar with its effects.    -- Indication: For Blood pressure- 75mg Daily    famotidine 20 mg oral tablet  -- 1 tab(s) by mouth once a day  -- Indication: For Stomach health    docusate sodium 100 mg oral capsule  -- 1 cap(s) by mouth 2 times a day, As Needed -for constipation   -- Indication: For Stool softener    senna oral tablet  -- 2 tab(s) by mouth once a day (at bedtime), As Needed -for constipation   -- Indication: For Stool softener    Travatan 0.004% ophthalmic solution  -- 1 drop(s) to each affected eye once a day (in the evening)  -- Indication: For Eye drops.     I will STOP taking the medications listed below when I get home from the hospital:    Procardia XL 60 mg oral tablet, extended release  -- 2 tab(s) by mouth once a day.     I will SWITCH the dose or number of times a day I take the medications listed below when I get home from the hospital:    Toprol- mg oral tablet, extended release  -- 1 tab(s) by mouth once a day.    Discharge CXR:  < from: Xray Chest 1 View AP -PORTABLE-Routine (10.18.17 @ 04:42) >  INTERPRETATION:    Portable AP Radiograph dated 10/18/2017 4:42 AM    CLINICAL INFORMATION: 82 years, Female, Follow Up.    PRIOR STUDIES: October 17, 2017    FINDINGS: Heart size is stable. No focal consolidation or pleural   effusion. No pneumothorax. Osseous structures are intact.    IMPRESSION:  No acute disease. No significant change.    < end of copied text >      Discharge ECHO:  < from: Echocardiogram (10.18.17 @ 11:44) >  Interpretation Summary  Normal biatrial  size. Jhony valve in aortic position is well seated . No   aortic regurgitation noted. The calculated  EOA is 1.8 cm2, EOAI is 0.8   cm2/m2. The dimensionless index (ratio of LVOTvelocity to aortic   velocity) was   calculated to be 0.58. The mean pressure gradient is 15 mmHg.  There is   mild   mitral valve thickening. There is trace mitral regurgitation.Structurally   normal tricuspid valve. There is trace tricuspid regurgitation. There was   insufficient TR detected from which to calculate pulmonary artery   systolic   pressure.  The pulmonic valve is not well visualized. No pulmonic   regurgitation noted.The right ventricle is normal in size and   function.There   is severe concentric left ventricular hypertrophy. The left ventricular   wall   motion is normal. The left ventricular ejection fraction is 72%. Grade I   diastolic dysfunction.  No aortic root dilatation.There is no pericardial   effusion.When compared to prior study performed on 8/4/17, the Jhony   valve   at aortic position is new.    < end of copied text >

## 2017-10-23 ENCOUNTER — APPOINTMENT (OUTPATIENT)
Dept: CARDIOTHORACIC SURGERY | Facility: CLINIC | Age: 82
End: 2017-10-23
Payer: MEDICARE

## 2017-10-23 VITALS
WEIGHT: 228 LBS | RESPIRATION RATE: 19 BRPM | TEMPERATURE: 98.6 F | DIASTOLIC BLOOD PRESSURE: 61 MMHG | BODY MASS INDEX: 40.39 KG/M2 | SYSTOLIC BLOOD PRESSURE: 120 MMHG | HEART RATE: 84 BPM | OXYGEN SATURATION: 98 %

## 2017-10-23 DIAGNOSIS — Z79.82 LONG TERM (CURRENT) USE OF ASPIRIN: ICD-10-CM

## 2017-10-23 DIAGNOSIS — I50.32 CHRONIC DIASTOLIC (CONGESTIVE) HEART FAILURE: ICD-10-CM

## 2017-10-23 DIAGNOSIS — E78.5 HYPERLIPIDEMIA, UNSPECIFIED: ICD-10-CM

## 2017-10-23 DIAGNOSIS — E66.9 OBESITY, UNSPECIFIED: ICD-10-CM

## 2017-10-23 DIAGNOSIS — I35.0 NONRHEUMATIC AORTIC (VALVE) STENOSIS: ICD-10-CM

## 2017-10-23 DIAGNOSIS — I37.1 NONRHEUMATIC PULMONARY VALVE INSUFFICIENCY: ICD-10-CM

## 2017-10-23 DIAGNOSIS — N18.9 CHRONIC KIDNEY DISEASE, UNSPECIFIED: ICD-10-CM

## 2017-10-23 DIAGNOSIS — I07.1 RHEUMATIC TRICUSPID INSUFFICIENCY: ICD-10-CM

## 2017-10-23 DIAGNOSIS — I13.0 HYPERTENSIVE HEART AND CHRONIC KIDNEY DISEASE WITH HEART FAILURE AND STAGE 1 THROUGH STAGE 4 CHRONIC KIDNEY DISEASE, OR UNSPECIFIED CHRONIC KIDNEY DISEASE: ICD-10-CM

## 2017-10-23 DIAGNOSIS — Z82.49 FAMILY HISTORY OF ISCHEMIC HEART DISEASE AND OTHER DISEASES OF THE CIRCULATORY SYSTEM: ICD-10-CM

## 2017-10-23 DIAGNOSIS — I25.10 ATHEROSCLEROTIC HEART DISEASE OF NATIVE CORONARY ARTERY WITHOUT ANGINA PECTORIS: ICD-10-CM

## 2017-10-23 DIAGNOSIS — E11.40 TYPE 2 DIABETES MELLITUS WITH DIABETIC NEUROPATHY, UNSPECIFIED: ICD-10-CM

## 2017-10-23 DIAGNOSIS — Z79.84 LONG TERM (CURRENT) USE OF ORAL HYPOGLYCEMIC DRUGS: ICD-10-CM

## 2017-10-23 PROCEDURE — 99214 OFFICE O/P EST MOD 30 MIN: CPT

## 2017-11-19 ENCOUNTER — FORM ENCOUNTER (OUTPATIENT)
Age: 82
End: 2017-11-19

## 2017-11-20 ENCOUNTER — APPOINTMENT (OUTPATIENT)
Dept: CARDIOTHORACIC SURGERY | Facility: CLINIC | Age: 82
End: 2017-11-20
Payer: MEDICARE

## 2017-11-20 ENCOUNTER — OUTPATIENT (OUTPATIENT)
Dept: OUTPATIENT SERVICES | Facility: HOSPITAL | Age: 82
LOS: 1 days | End: 2017-11-20
Payer: MEDICARE

## 2017-11-20 VITALS
RESPIRATION RATE: 21 BRPM | HEART RATE: 101 BPM | SYSTOLIC BLOOD PRESSURE: 140 MMHG | BODY MASS INDEX: 40.57 KG/M2 | WEIGHT: 229 LBS | TEMPERATURE: 98.3 F | DIASTOLIC BLOOD PRESSURE: 64 MMHG | OXYGEN SATURATION: 99 %

## 2017-11-20 DIAGNOSIS — I35.0 NONRHEUMATIC AORTIC (VALVE) STENOSIS: ICD-10-CM

## 2017-11-20 LAB
ALBUMIN SERPL ELPH-MCNC: 4.2 G/DL — SIGNIFICANT CHANGE UP (ref 3.3–5)
ALP SERPL-CCNC: 64 U/L — SIGNIFICANT CHANGE UP (ref 40–120)
ALT FLD-CCNC: 11 U/L — SIGNIFICANT CHANGE UP (ref 10–45)
ANION GAP SERPL CALC-SCNC: 15 MMOL/L — SIGNIFICANT CHANGE UP (ref 5–17)
AST SERPL-CCNC: 16 U/L — SIGNIFICANT CHANGE UP (ref 10–40)
BASOPHILS NFR BLD AUTO: 0.4 % — SIGNIFICANT CHANGE UP (ref 0–2)
BILIRUB SERPL-MCNC: 0.7 MG/DL — SIGNIFICANT CHANGE UP (ref 0.2–1.2)
BUN SERPL-MCNC: 19 MG/DL — SIGNIFICANT CHANGE UP (ref 7–23)
CALCIUM SERPL-MCNC: 9.9 MG/DL — SIGNIFICANT CHANGE UP (ref 8.4–10.5)
CHLORIDE SERPL-SCNC: 102 MMOL/L — SIGNIFICANT CHANGE UP (ref 96–108)
CO2 SERPL-SCNC: 24 MMOL/L — SIGNIFICANT CHANGE UP (ref 22–31)
CREAT SERPL-MCNC: 1.31 MG/DL — HIGH (ref 0.5–1.3)
EOSINOPHIL NFR BLD AUTO: 1.1 % — SIGNIFICANT CHANGE UP (ref 0–6)
GLUCOSE SERPL-MCNC: 175 MG/DL — HIGH (ref 70–99)
HCT VFR BLD CALC: 31.7 % — LOW (ref 34.5–45)
HGB BLD-MCNC: 10.4 G/DL — LOW (ref 11.5–15.5)
LYMPHOCYTES # BLD AUTO: 29.6 % — SIGNIFICANT CHANGE UP (ref 13–44)
MCHC RBC-ENTMCNC: 29.5 PG — SIGNIFICANT CHANGE UP (ref 27–34)
MCHC RBC-ENTMCNC: 32.8 G/DL — SIGNIFICANT CHANGE UP (ref 32–36)
MCV RBC AUTO: 90.1 FL — SIGNIFICANT CHANGE UP (ref 80–100)
MONOCYTES NFR BLD AUTO: 5.5 % — SIGNIFICANT CHANGE UP (ref 2–14)
NEUTROPHILS NFR BLD AUTO: 63.4 % — SIGNIFICANT CHANGE UP (ref 43–77)
PLATELET # BLD AUTO: 208 K/UL — SIGNIFICANT CHANGE UP (ref 150–400)
POTASSIUM SERPL-MCNC: 3.5 MMOL/L — SIGNIFICANT CHANGE UP (ref 3.5–5.3)
POTASSIUM SERPL-SCNC: 3.5 MMOL/L — SIGNIFICANT CHANGE UP (ref 3.5–5.3)
PROT SERPL-MCNC: 7.7 G/DL — SIGNIFICANT CHANGE UP (ref 6–8.3)
RBC # BLD: 3.52 M/UL — LOW (ref 3.8–5.2)
RBC # FLD: 14.7 % — SIGNIFICANT CHANGE UP (ref 10.3–16.9)
SODIUM SERPL-SCNC: 141 MMOL/L — SIGNIFICANT CHANGE UP (ref 135–145)
WBC # BLD: 7.3 K/UL — SIGNIFICANT CHANGE UP (ref 3.8–10.5)
WBC # FLD AUTO: 7.3 K/UL — SIGNIFICANT CHANGE UP (ref 3.8–10.5)

## 2017-11-20 PROCEDURE — 85025 COMPLETE CBC W/AUTO DIFF WBC: CPT

## 2017-11-20 PROCEDURE — 93306 TTE W/DOPPLER COMPLETE: CPT | Mod: 26

## 2017-11-20 PROCEDURE — 80053 COMPREHEN METABOLIC PANEL: CPT

## 2017-11-20 PROCEDURE — 93005 ELECTROCARDIOGRAM TRACING: CPT

## 2017-11-20 PROCEDURE — 99214 OFFICE O/P EST MOD 30 MIN: CPT

## 2017-11-20 PROCEDURE — 93010 ELECTROCARDIOGRAM REPORT: CPT

## 2017-11-20 PROCEDURE — 93306 TTE W/DOPPLER COMPLETE: CPT

## 2017-12-19 PROCEDURE — 86900 BLOOD TYPING SEROLOGIC ABO: CPT

## 2017-12-19 PROCEDURE — 82803 BLOOD GASES ANY COMBINATION: CPT

## 2017-12-19 PROCEDURE — 84100 ASSAY OF PHOSPHORUS: CPT

## 2017-12-19 PROCEDURE — 93306 TTE W/DOPPLER COMPLETE: CPT

## 2017-12-19 PROCEDURE — 82330 ASSAY OF CALCIUM: CPT

## 2017-12-19 PROCEDURE — 84132 ASSAY OF SERUM POTASSIUM: CPT

## 2017-12-19 PROCEDURE — 85025 COMPLETE CBC W/AUTO DIFF WBC: CPT

## 2017-12-19 PROCEDURE — 84295 ASSAY OF SERUM SODIUM: CPT

## 2017-12-19 PROCEDURE — 85730 THROMBOPLASTIN TIME PARTIAL: CPT

## 2017-12-19 PROCEDURE — C1889: CPT

## 2017-12-19 PROCEDURE — 83735 ASSAY OF MAGNESIUM: CPT

## 2017-12-19 PROCEDURE — 97161 PT EVAL LOW COMPLEX 20 MIN: CPT

## 2017-12-19 PROCEDURE — 36415 COLL VENOUS BLD VENIPUNCTURE: CPT

## 2017-12-19 PROCEDURE — 80048 BASIC METABOLIC PNL TOTAL CA: CPT

## 2017-12-19 PROCEDURE — 86850 RBC ANTIBODY SCREEN: CPT

## 2017-12-19 PROCEDURE — 82962 GLUCOSE BLOOD TEST: CPT

## 2017-12-19 PROCEDURE — 86901 BLOOD TYPING SEROLOGIC RH(D): CPT

## 2017-12-19 PROCEDURE — C1760: CPT

## 2017-12-19 PROCEDURE — 85027 COMPLETE CBC AUTOMATED: CPT

## 2017-12-19 PROCEDURE — 80053 COMPREHEN METABOLIC PANEL: CPT

## 2017-12-19 PROCEDURE — 85610 PROTHROMBIN TIME: CPT

## 2017-12-19 PROCEDURE — C1769: CPT

## 2017-12-19 PROCEDURE — 93321 DOPPLER ECHO F-UP/LMTD STD: CPT

## 2017-12-19 PROCEDURE — C1887: CPT

## 2017-12-19 PROCEDURE — C1894: CPT

## 2017-12-19 PROCEDURE — 83880 ASSAY OF NATRIURETIC PEPTIDE: CPT

## 2017-12-19 PROCEDURE — 93005 ELECTROCARDIOGRAM TRACING: CPT

## 2017-12-19 PROCEDURE — 71045 X-RAY EXAM CHEST 1 VIEW: CPT

## 2017-12-19 PROCEDURE — 86923 COMPATIBILITY TEST ELECTRIC: CPT

## 2018-11-12 ENCOUNTER — APPOINTMENT (OUTPATIENT)
Dept: CARDIOTHORACIC SURGERY | Facility: CLINIC | Age: 83
End: 2018-11-12
Payer: MEDICARE

## 2018-12-16 ENCOUNTER — FORM ENCOUNTER (OUTPATIENT)
Age: 83
End: 2018-12-16

## 2018-12-17 ENCOUNTER — APPOINTMENT (OUTPATIENT)
Dept: CARDIOTHORACIC SURGERY | Facility: CLINIC | Age: 83
End: 2018-12-17
Payer: MEDICARE

## 2018-12-17 ENCOUNTER — OUTPATIENT (OUTPATIENT)
Dept: OUTPATIENT SERVICES | Facility: HOSPITAL | Age: 83
LOS: 1 days | End: 2018-12-17
Payer: MEDICARE

## 2018-12-17 VITALS
BODY MASS INDEX: 40.74 KG/M2 | RESPIRATION RATE: 19 BRPM | SYSTOLIC BLOOD PRESSURE: 146 MMHG | WEIGHT: 230 LBS | OXYGEN SATURATION: 98 % | TEMPERATURE: 97.5 F | HEART RATE: 84 BPM | DIASTOLIC BLOOD PRESSURE: 68 MMHG

## 2018-12-17 DIAGNOSIS — I35.9 NONRHEUMATIC AORTIC VALVE DISORDER, UNSPECIFIED: ICD-10-CM

## 2018-12-17 LAB
ALBUMIN SERPL ELPH-MCNC: 4.1 G/DL — SIGNIFICANT CHANGE UP (ref 3.3–5)
ALP SERPL-CCNC: 52 U/L — SIGNIFICANT CHANGE UP (ref 40–120)
ALT FLD-CCNC: 11 U/L — SIGNIFICANT CHANGE UP (ref 10–45)
ANION GAP SERPL CALC-SCNC: 17 MMOL/L — SIGNIFICANT CHANGE UP (ref 5–17)
AST SERPL-CCNC: 15 U/L — SIGNIFICANT CHANGE UP (ref 10–40)
BASOPHILS NFR BLD AUTO: 0.4 % — SIGNIFICANT CHANGE UP (ref 0–2)
BILIRUB SERPL-MCNC: 0.7 MG/DL — SIGNIFICANT CHANGE UP (ref 0.2–1.2)
BUN SERPL-MCNC: 24 MG/DL — HIGH (ref 7–23)
CALCIUM SERPL-MCNC: 10.3 MG/DL — SIGNIFICANT CHANGE UP (ref 8.4–10.5)
CHLORIDE SERPL-SCNC: 105 MMOL/L — SIGNIFICANT CHANGE UP (ref 96–108)
CO2 SERPL-SCNC: 25 MMOL/L — SIGNIFICANT CHANGE UP (ref 22–31)
CREAT SERPL-MCNC: 1.28 MG/DL — SIGNIFICANT CHANGE UP (ref 0.5–1.3)
EOSINOPHIL NFR BLD AUTO: 1.4 % — SIGNIFICANT CHANGE UP (ref 0–6)
GLUCOSE SERPL-MCNC: 148 MG/DL — HIGH (ref 70–99)
HCT VFR BLD CALC: 32 % — LOW (ref 34.5–45)
HGB BLD-MCNC: 10.7 G/DL — LOW (ref 11.5–15.5)
LYMPHOCYTES # BLD AUTO: 27.3 % — SIGNIFICANT CHANGE UP (ref 13–44)
MCHC RBC-ENTMCNC: 30.4 PG — SIGNIFICANT CHANGE UP (ref 27–34)
MCHC RBC-ENTMCNC: 33.4 G/DL — SIGNIFICANT CHANGE UP (ref 32–36)
MCV RBC AUTO: 90.9 FL — SIGNIFICANT CHANGE UP (ref 80–100)
MONOCYTES NFR BLD AUTO: 8.8 % — SIGNIFICANT CHANGE UP (ref 2–14)
NEUTROPHILS NFR BLD AUTO: 62.1 % — SIGNIFICANT CHANGE UP (ref 43–77)
PLATELET # BLD AUTO: 198 K/UL — SIGNIFICANT CHANGE UP (ref 150–400)
POTASSIUM SERPL-MCNC: 3.7 MMOL/L — SIGNIFICANT CHANGE UP (ref 3.5–5.3)
POTASSIUM SERPL-SCNC: 3.7 MMOL/L — SIGNIFICANT CHANGE UP (ref 3.5–5.3)
PROT SERPL-MCNC: 8 G/DL — SIGNIFICANT CHANGE UP (ref 6–8.3)
RBC # BLD: 3.52 M/UL — LOW (ref 3.8–5.2)
RBC # FLD: 14.3 % — SIGNIFICANT CHANGE UP (ref 10.3–16.9)
SODIUM SERPL-SCNC: 147 MMOL/L — HIGH (ref 135–145)
WBC # BLD: 8.4 K/UL — SIGNIFICANT CHANGE UP (ref 3.8–10.5)
WBC # FLD AUTO: 8.4 K/UL — SIGNIFICANT CHANGE UP (ref 3.8–10.5)

## 2018-12-17 PROCEDURE — 93306 TTE W/DOPPLER COMPLETE: CPT

## 2018-12-17 PROCEDURE — 99214 OFFICE O/P EST MOD 30 MIN: CPT

## 2018-12-17 PROCEDURE — 80053 COMPREHEN METABOLIC PANEL: CPT

## 2018-12-17 PROCEDURE — 36415 COLL VENOUS BLD VENIPUNCTURE: CPT

## 2018-12-17 PROCEDURE — 93306 TTE W/DOPPLER COMPLETE: CPT | Mod: 26

## 2018-12-17 PROCEDURE — 85025 COMPLETE CBC W/AUTO DIFF WBC: CPT

## 2018-12-18 RX ORDER — AMLODIPINE BESYLATE 10 MG/1
10 TABLET ORAL DAILY
Refills: 0 | Status: ACTIVE | COMMUNITY

## 2018-12-18 NOTE — HISTORY OF PRESENT ILLNESS
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 \par \par Since her last visit, the patient states she feels great without limitations. She denies any chest pain, SOB at rest or with exertion, syncope, palpitations, dizziness, syncope, or LE edema.

## 2018-12-18 NOTE — REVIEW OF SYSTEMS
[Fever] : no fever [Headache] : no headache [Chills] : no chills [Feeling Fatigued] : not feeling fatigued [Shortness Of Breath] : no shortness of breath [Dyspnea on exertion] : not dyspnea during exertion [Chest  Pressure] : no chest pressure [Chest Pain] : no chest pain [Lower Ext Edema] : no extremity edema [Leg Claudication] : no intermittent leg claudication [Palpitations] : no palpitations

## 2019-10-20 ENCOUNTER — FORM ENCOUNTER (OUTPATIENT)
Age: 84
End: 2019-10-20

## 2019-10-21 ENCOUNTER — APPOINTMENT (OUTPATIENT)
Dept: CARDIOTHORACIC SURGERY | Facility: CLINIC | Age: 84
End: 2019-10-21
Payer: MEDICARE

## 2019-10-21 ENCOUNTER — OUTPATIENT (OUTPATIENT)
Dept: OUTPATIENT SERVICES | Facility: HOSPITAL | Age: 84
LOS: 1 days | End: 2019-10-21
Payer: MEDICARE

## 2019-10-21 VITALS
OXYGEN SATURATION: 96 % | BODY MASS INDEX: 38.27 KG/M2 | RESPIRATION RATE: 18 BRPM | WEIGHT: 216 LBS | DIASTOLIC BLOOD PRESSURE: 66 MMHG | TEMPERATURE: 97.1 F | HEART RATE: 80 BPM | SYSTOLIC BLOOD PRESSURE: 144 MMHG | HEIGHT: 63 IN

## 2019-10-21 DIAGNOSIS — I50.32 CHRONIC DIASTOLIC (CONGESTIVE) HEART FAILURE: ICD-10-CM

## 2019-10-21 DIAGNOSIS — I35.0 NONRHEUMATIC AORTIC (VALVE) STENOSIS: ICD-10-CM

## 2019-10-21 PROCEDURE — 93306 TTE W/DOPPLER COMPLETE: CPT | Mod: 26

## 2019-10-21 PROCEDURE — 93306 TTE W/DOPPLER COMPLETE: CPT

## 2019-10-21 PROCEDURE — 99214 OFFICE O/P EST MOD 30 MIN: CPT

## 2019-10-22 PROBLEM — I35.0 AORTIC STENOSIS: Status: ACTIVE | Noted: 2017-09-08

## 2019-10-22 PROBLEM — I50.32 CHRONIC DIASTOLIC CONGESTIVE HEART FAILURE: Status: ACTIVE | Noted: 2017-09-19

## 2019-10-22 RX ORDER — FAMOTIDINE 20 MG/1
20 TABLET, FILM COATED ORAL DAILY
Qty: 30 | Refills: 2 | Status: DISCONTINUED | COMMUNITY
End: 2019-10-22

## 2019-10-22 NOTE — HISTORY OF PRESENT ILLNESS
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 \par \par Since her last visit, the patient states she is feeling well.  She denies chest pain, SOB at rest or with exertion, palpitations, orthopnea, PND, dizziness, syncope, or LE edema.

## 2019-10-22 NOTE — REVIEW OF SYSTEMS
[Negative] : Cardiovascular [Fever] : no fever [Chills] : no chills [Headache] : no headache [Feeling Fatigued] : not feeling fatigued [Shortness Of Breath] : no shortness of breath [Dyspnea on exertion] : not dyspnea during exertion [Chest  Pressure] : no chest pressure [Chest Pain] : no chest pain [Lower Ext Edema] : no extremity edema [Leg Claudication] : no intermittent leg claudication [Palpitations] : no palpitations

## 2019-10-22 NOTE — PHYSICAL EXAM
[Normal Appearance] : normal appearance [] : no respiratory distress [General Appearance - In No Acute Distress] : no acute distress [Normal Jugular Venous V Waves Present] : normal jugular venous V waves present [Heart Rate And Rhythm] : heart rate and rhythm were normal [Respiration, Rhythm And Depth] : normal respiratory rhythm and effort [Auscultation Breath Sounds / Voice Sounds] : lungs were clear to auscultation bilaterally [Heart Sounds] : normal S1 and S2 [Edema] : no peripheral edema present [Abdomen Tenderness] : non-tender [Abdomen Soft] : soft [Abnormal Walk] : normal gait [Oriented To Time, Place, And Person] : oriented to person, place, and time [Exaggerated Use Of Accessory Muscles For Inspiration] : no accessory muscle use [Cyanosis, Localized] : no localized cyanosis [Nail Clubbing] : no clubbing of the fingernails [FreeTextEntry1] : Uses a cane.

## 2020-02-22 NOTE — BRIEF OPERATIVE NOTE - OPERATION/FINDINGS
14F rt CFA, 5F lt CFA, 6F rt CFV sheaths  TVP placed into RV apex  BAV performed using a [id number] Eballoon x1  23mm (+1cc) S3 THV was positioned/deployed across AV  LV/Ao peak/mean gradient <2mmHg; AI index 28%  TTE showed no PVL; nml LV function  no conduction issues  hemostasis: rt CFA proglide x2, lt CFA proglide x1, rt CFV TVP mattress suture  imp: severe AS s/p 23mm (+1) S3 THV without PVL; chronic diastolic CHF  plan:  -routine post-catheterization care  -asa 81mg daily tomorrow  -plavix 300mg po x1 tonight, 75mg daily tomorrow  -TTE tomorrow am  -IV abx
verbal instruction/written material/skill demonstration

## 2021-04-12 ENCOUNTER — APPOINTMENT (OUTPATIENT)
Dept: CARDIOTHORACIC SURGERY | Facility: CLINIC | Age: 86
End: 2021-04-12
